# Patient Record
Sex: FEMALE | NOT HISPANIC OR LATINO | Employment: STUDENT | URBAN - METROPOLITAN AREA
[De-identification: names, ages, dates, MRNs, and addresses within clinical notes are randomized per-mention and may not be internally consistent; named-entity substitution may affect disease eponyms.]

---
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
## 2020-12-23 ENCOUNTER — OFFICE VISIT (OUTPATIENT)
Dept: INTERNAL MEDICINE | Facility: CLINIC | Age: 20
End: 2020-12-23
Payer: COMMERCIAL

## 2020-12-23 VITALS
HEIGHT: 66 IN | DIASTOLIC BLOOD PRESSURE: 72 MMHG | BODY MASS INDEX: 25.05 KG/M2 | TEMPERATURE: 98.5 F | SYSTOLIC BLOOD PRESSURE: 102 MMHG | WEIGHT: 155.9 LBS | OXYGEN SATURATION: 100 % | HEART RATE: 82 BPM

## 2020-12-23 DIAGNOSIS — E55.9 VITAMIN D DEFICIENCY: ICD-10-CM

## 2020-12-23 DIAGNOSIS — S29.012A UPPER BACK STRAIN, INITIAL ENCOUNTER: ICD-10-CM

## 2020-12-23 DIAGNOSIS — J45.20 INTERMITTENT ASTHMA, WELL CONTROLLED: Primary | ICD-10-CM

## 2020-12-23 DIAGNOSIS — L70.9 ACNE, UNSPECIFIED ACNE TYPE: ICD-10-CM

## 2020-12-23 DIAGNOSIS — M41.25 OTHER IDIOPATHIC SCOLIOSIS, THORACOLUMBAR REGION: ICD-10-CM

## 2020-12-23 PROCEDURE — 99214 OFFICE O/P EST MOD 30 MIN: CPT | Mod: 25 | Performed by: INTERNAL MEDICINE

## 2020-12-23 PROCEDURE — 90686 IIV4 VACC NO PRSV 0.5 ML IM: CPT | Performed by: INTERNAL MEDICINE

## 2020-12-23 PROCEDURE — 90471 IMMUNIZATION ADMIN: CPT | Performed by: INTERNAL MEDICINE

## 2020-12-23 RX ORDER — TRETINOIN 0.5 MG/G
CREAM TOPICAL
COMMUNITY
Start: 2020-09-11 | End: 2021-07-12

## 2020-12-23 RX ORDER — ESCITALOPRAM OXALATE 20 MG/1
TABLET ORAL
COMMUNITY
Start: 2020-11-25 | End: 2021-11-18 | Stop reason: SINTOL

## 2020-12-23 RX ORDER — CLINDAMYCIN PHOSPHATE 10 MG/G
GEL TOPICAL
COMMUNITY
Start: 2020-09-11 | End: 2021-11-18

## 2020-12-23 RX ORDER — ESCITALOPRAM OXALATE 5 MG/1
TABLET ORAL
COMMUNITY
Start: 2020-11-25 | End: 2021-11-18 | Stop reason: SINTOL

## 2020-12-23 RX ORDER — ALBUTEROL SULFATE 90 UG/1
2 AEROSOL, METERED RESPIRATORY (INHALATION) EVERY 4 HOURS PRN
Qty: 18 G | Refills: 11 | Status: SHIPPED | OUTPATIENT
Start: 2020-12-23 | End: 2022-01-05

## 2020-12-23 ASSESSMENT — MIFFLIN-ST. JEOR: SCORE: 1493.91

## 2020-12-23 NOTE — PROGRESS NOTES
Subjective     Emiliana Helms is a 20 year old female who presents to clinic today for the following health issues:    HPI         Derm Problem  Onset/Duration: since age 11, sx have worsened over the past 5 years  Description  Location: face, chest and back   Character: acne - cluster of raised red bumps and white heads   Itching: no  Intensity:  Moderate to severe   Progression of Symptoms:  worsening and constant  Accompanying signs and symptoms:   Fever: no  Body aches or joint pain: no  Sore throat symptoms: no  Recent cold symptoms: no  History:           Previous episodes of similar rash: None  New exposures:  None  Recent travel: no  Exposure to similar rash: no  Precipitating or alleviating factors: none  Therapies tried and outcome: clindamycin gel and tretinoin topical cream - used to be effective but not anymore, would like to discuss getting a referral to see dermatology     Has tried numerous therapies for acne that initially helped, but currently are not providing her adequate relief.  She is wishing to change to Accutane, and was told that I would be able to prescribe that for her.    2.    Asthma stable.  Has not had any recent breathing troubles beyond usual baseline.  Has not any recent acute respiratory events.  Using medication as directed with reported side effects    ACT Total Scores 8/18/2017 7/31/2019 12/23/2020   ACT TOTAL SCORE - - -   ASTHMA ER VISITS - - -   ASTHMA HOSPITALIZATIONS - - -   ACT TOTAL SCORE (Goal Greater than or Equal to 20) 23 20 24   In the past 12 months, how many times did you visit the emergency room for your asthma without being admitted to the hospital? 0 0 -   In the past 12 months, how many times were you hospitalized overnight because of your asthma? 0 0 0        3.  Depression:  On escitalopram through [facility], working well, no side effects     4.  Chronic upper back pain, significantly more noticeable since she has been doing much more computer  "screen time over the past several months.  She has a history of scoliosis, discussed in 2014.  Reviewed x-rays from that time showing throat thoracolumbar scoliosis.  She is never had any formal physical therapy or treatment for this.  Has chronic pain in the upper shoulders and right shoulder area.    **I reviewed the information recorded in the patient's EPIC chart (including but not limited to medical history, surgical history, family history, problem list, medication list, and allergy list) and updated the information as indicated based on the patients reported information.         Review of Systems   Constitutional, HEENT, cardiovascular, pulmonary, gi and gu systems are negative, except as otherwise noted.      Objective    /72   Pulse 82   Temp 98.5  F (36.9  C) (Temporal)   Ht 1.676 m (5' 6\")   Wt 70.7 kg (155 lb 14.4 oz)   LMP 12/22/2020 (Exact Date)   SpO2 100%   BMI 25.16 kg/m    Body mass index is 25.16 kg/m .  Physical Exam   GENERAL alert and no distress  EYES:  Normal sclera,conjunctiva, EOMI  HENT: oral and posterior pharynx without lesions or erythema, facies symmetric  NECK: Neck supple. No LAD, without thyroidmegaly.  RESP: Clear to ausculation bilaterally without wheezes or crackles. Normal BS in all fields.  CV: RRR normal S1S2 without murmurs, rubs or gallops.  LYMPH: no cervical lymph adenopathy appreciated  MS: extremities-scoliosis of the mid spine, prominence of the right rhomboid and scapula.  Able to fully flex forward, no radicular signs or symptoms.  EXT:  no lower extremity edema  PSYCH: Alert and oriented times 3; speech- coherent  SKIN:  No obvious significant skin lesions on visible portions of face             (J45.20) Intermittent asthma, well controlled  (primary encounter diagnosis)  Comment: This condition is currently controlled on the current medical regimen.  Continue current therapy.   Plan: albuterol (PROAIR HFA/PROVENTIL HFA/VENTOLIN         HFA) 108 (90 " Base) MCG/ACT inhaler            (M41.25) Other idiopathic scoliosis, thoracolumbar region  Comment: Reviewed x-rays from 2014 showing the thoracal lumbar scoliosis to a mild degree.  She has never endured any treatment for this, not seeing any physical therapist.  Explained the role of the scoliosis producing abnormal can ask when she sits at a computer desk over time, and have scoliosis creates a mechanical disadvantage.  Recommend starting with physical therapy for stretching and strengthening.  Refer to physical medicine and rehabilitation if she does not have any improvement.  Plan: [facility]            (S29.012A) Upper back strain, initial encounter  Comment:   Plan: [facility]            (E55.9) Vitamin D deficiency  Comment: Recommend least 2000 is vitamin D every day, if not 4000 November through March.  Plan:     (L70.9) Acne, unspecified acne type  Comment: Multiple treatments tried, she feels they are currently not effective for her.  She wishes to consider Accutane.  Explained to her that I have not able to prescribe Accutane because requires special certification but I do not have.  Briefly describe the serious nature of this medicine including the significant teratogenic potential, especially in women of childbearing age.  She should make referral to dermatology clinic who will discuss the use of this medicine and consider prescribing it if mutually agreeable.  Plan: DERMATOLOGY ADULT REFERRAL             I spent greater than 25 minutes with pt, greater than 50% of time was educational and counseling.

## 2020-12-23 NOTE — PATIENT INSTRUCTIONS
*  Referral to physical therapists for management of the scoliosis and chronic upper back pain    *  Possible referral to physical medicine and rehabilitation specialists if not much better.     *  Make appointment in the Dermatology Clinic for evaluation of Accutane for acne management.      *  Use the Albuterol inhaler as needed for any coughing, wheezing, tightness in the breathing, or shortness of breath.   Consider using it before any known triggers for our coughing or wheezing (usually these include cold or hot temperatures, humidity, dust, air pollutants, smoke).  Expect that your airways may remain more easily irritated for a few weeks after upper respiratory infections.     If you require the albuterol rescue inhaler on a regular basis more than a few times per week, you may need additional medications to help control the breathing better.    These are the available forms of Albuterol, your insurance formulary will determine which one is preferred.  They all work the same.

## 2020-12-31 NOTE — PROGRESS NOTES
[facility] Initial Evaluation    Subjective:  Emiliana Helms is a 20 year old female with a thoracic condition. Symptoms commenced as a result of: chronic upper back and thoracic spine pain for the past 5 years starting for unknown reasons. Condition occurred in the following environment: NA. Onset of symptoms: Date on order: 12/23/20. Location of symptoms: upper traps, bilateral middle and lower thoracic spine. Pain level on number scale: 1-7/10. Quality of pain: soreness. Associated symptoms: stiffness, denies numbness and tingling. Pain frequency (constant/intermittent): constant. Symptoms are exacerbated by: sitting, computer work, sleeping, laying in bed. Symptoms are relieved by: heat, movement. Progression of symptoms since onset (same/better/worse): worse. Special tests (x-ray, MRI, CT scan, EMG, bone scan): none recently. Previous treatment: None. Improvement with previous treatment: NA. General health as reported by patient is excellent. Pertinent medical history includes: See Epic. Medical allergies: see Epic. Other pertinent surgeries: see Epic. Current medications: See Epic. Occupation: [employer] student. Patient is (working in normal job without restrictions/working in normal job with restrictions/working in an alternate job/not working due to present treatment problem): NA. Primary job tasks: NA. Barriers at home/work: None reported by patient. Red flags: None reported by patient.    Objective  Posture     Sitting (good/fair/poor):  poor  Standing (good/fair/poor):  fair  Protruded head (yes/no):  no  Wry neck (right/left/nil):  nil  Relevant wry neck (yes/no):  no  Correction of posture (better/worse/no effect): better    Cervical Movement Loss Fausto Mod Min Nil Pain   Protrusion    x    Flexion    x    Retraction   x     Extension   x     Rotation Left   x     Rotation Right   x     Lateral Flexion Right    x    Lateral Flexion Left    x      Thoracic AROM: FL = WNL, EXT = min  loss  Weak scapular stabilizers  Tight pectorals    Assessment/Plan:    Patient is a 20 year old female with cervical and thoracic complaints.    Patient has the following significant findings with corresponding treatment plan.                Diagnosis 1:  Upper back pain  Pain -  manual therapy, self management, education, directional preference exercise and home program  Decreased ROM/flexibility - manual therapy and therapeutic exercise  Decreased joint mobility - manual therapy and therapeutic exercise  Decreased strength - therapeutic exercise and therapeutic activities  Impaired muscle performance - neuro re-education  Decreased function - therapeutic activities  Impaired posture - neuro re-education    Previous and current functional limitations:  (See Goal Flow Sheet for this information)    Short term and Long term goals: (See Goal Flow Sheet for this information)     Communication ability:  Patient appears to be able to clearly communicate and understand verbal and written communication and follow directions correctly.  Treatment Explanation - The following has been discussed with the patient:   RX ordered/plan of care  Anticipated outcomes  Possible risks and side effects  This patient would benefit from PT intervention to resume normal activities.   Rehab potential is good.    Frequency:  1 X week, once daily  Duration:  for 4 weeks tapering to 2 X a month over 1 month  Discharge Plan:  Achieve all LTG.  Independent in home treatment program.  Reach maximal therapeutic benefit.    Please refer to the daily flowsheet for treatment today, total treatment time and time spent performing 1:1 timed codes.

## 2021-01-04 ENCOUNTER — THERAPY VISIT (OUTPATIENT)
Dept: PHYSICAL THERAPY | Facility: CLINIC | Age: 21
End: 2021-01-04
Attending: INTERNAL MEDICINE
Payer: COMMERCIAL

## 2021-01-04 DIAGNOSIS — S29.012A UPPER BACK STRAIN, INITIAL ENCOUNTER: ICD-10-CM

## 2021-01-04 DIAGNOSIS — M54.9 UPPER BACK PAIN: ICD-10-CM

## 2021-01-04 DIAGNOSIS — M41.25 OTHER IDIOPATHIC SCOLIOSIS, THORACOLUMBAR REGION: ICD-10-CM

## 2021-01-04 PROCEDURE — 97161 PT EVAL LOW COMPLEX 20 MIN: CPT | Mod: GP | Performed by: PHYSICAL THERAPIST

## 2021-01-04 PROCEDURE — 97110 THERAPEUTIC EXERCISES: CPT | Mod: GP | Performed by: PHYSICAL THERAPIST

## 2021-01-04 PROCEDURE — 97530 THERAPEUTIC ACTIVITIES: CPT | Mod: GP | Performed by: PHYSICAL THERAPIST

## 2021-01-11 ENCOUNTER — THERAPY VISIT (OUTPATIENT)
Dept: PHYSICAL THERAPY | Facility: CLINIC | Age: 21
End: 2021-01-11
Payer: COMMERCIAL

## 2021-01-11 DIAGNOSIS — M54.9 UPPER BACK PAIN: ICD-10-CM

## 2021-01-11 PROCEDURE — 97110 THERAPEUTIC EXERCISES: CPT | Mod: GP | Performed by: PHYSICAL THERAPIST

## 2021-01-13 ENCOUNTER — OFFICE VISIT (OUTPATIENT)
Dept: DERMATOLOGY | Facility: CLINIC | Age: 21
End: 2021-01-13
Payer: COMMERCIAL

## 2021-01-13 VITALS — SYSTOLIC BLOOD PRESSURE: 91 MMHG | HEART RATE: 73 BPM | OXYGEN SATURATION: 100 % | DIASTOLIC BLOOD PRESSURE: 59 MMHG

## 2021-01-13 DIAGNOSIS — L81.0 POST-INFLAMMATORY HYPERPIGMENTATION: ICD-10-CM

## 2021-01-13 DIAGNOSIS — L70.0 ACNE VULGARIS: ICD-10-CM

## 2021-01-13 DIAGNOSIS — Z51.81 MEDICATION MONITORING ENCOUNTER: Primary | ICD-10-CM

## 2021-01-13 LAB
ALBUMIN SERPL-MCNC: 3.6 G/DL (ref 3.4–5)
ALP SERPL-CCNC: 59 U/L (ref 40–150)
ALT SERPL W P-5'-P-CCNC: 18 U/L (ref 0–50)
ANION GAP SERPL CALCULATED.3IONS-SCNC: 7 MMOL/L (ref 3–14)
AST SERPL W P-5'-P-CCNC: 13 U/L (ref 0–45)
BASOPHILS # BLD AUTO: 0 10E9/L (ref 0–0.2)
BASOPHILS NFR BLD AUTO: 0.6 %
BILIRUB SERPL-MCNC: 0.3 MG/DL (ref 0.2–1.3)
BUN SERPL-MCNC: 8 MG/DL (ref 7–30)
CALCIUM SERPL-MCNC: 8.7 MG/DL (ref 8.5–10.1)
CHLORIDE SERPL-SCNC: 108 MMOL/L (ref 94–109)
CHOLEST SERPL-MCNC: 183 MG/DL
CO2 SERPL-SCNC: 25 MMOL/L (ref 20–32)
CREAT SERPL-MCNC: 0.64 MG/DL (ref 0.52–1.04)
DIFFERENTIAL METHOD BLD: ABNORMAL
EOSINOPHIL # BLD AUTO: 0.1 10E9/L (ref 0–0.7)
EOSINOPHIL NFR BLD AUTO: 2.2 %
ERYTHROCYTE [DISTWIDTH] IN BLOOD BY AUTOMATED COUNT: 13.7 % (ref 10–15)
GFR SERPL CREATININE-BSD FRML MDRD: >90 ML/MIN/{1.73_M2}
GLUCOSE SERPL-MCNC: 85 MG/DL (ref 70–99)
HCG UR QL: NEGATIVE
HCT VFR BLD AUTO: 38.1 % (ref 35–47)
HDLC SERPL-MCNC: 82 MG/DL
HGB BLD-MCNC: 12.1 G/DL (ref 11.7–15.7)
LDLC SERPL CALC-MCNC: 93 MG/DL
LYMPHOCYTES # BLD AUTO: 1.8 10E9/L (ref 0.8–5.3)
LYMPHOCYTES NFR BLD AUTO: 56.4 %
MCH RBC QN AUTO: 27.7 PG (ref 26.5–33)
MCHC RBC AUTO-ENTMCNC: 31.8 G/DL (ref 31.5–36.5)
MCV RBC AUTO: 87 FL (ref 78–100)
MONOCYTES # BLD AUTO: 0.2 10E9/L (ref 0–1.3)
MONOCYTES NFR BLD AUTO: 6.4 %
NEUTROPHILS # BLD AUTO: 1.1 10E9/L (ref 1.6–8.3)
NEUTROPHILS NFR BLD AUTO: 34.4 %
NONHDLC SERPL-MCNC: 101 MG/DL
PLATELET # BLD AUTO: 232 10E9/L (ref 150–450)
POTASSIUM SERPL-SCNC: 3.6 MMOL/L (ref 3.4–5.3)
PROT SERPL-MCNC: 7.1 G/DL (ref 6.8–8.8)
RBC # BLD AUTO: 4.37 10E12/L (ref 3.8–5.2)
SODIUM SERPL-SCNC: 140 MMOL/L (ref 133–144)
TRIGL SERPL-MCNC: 40 MG/DL
WBC # BLD AUTO: 3.1 10E9/L (ref 4–11)

## 2021-01-13 PROCEDURE — 36415 COLL VENOUS BLD VENIPUNCTURE: CPT | Performed by: PHYSICIAN ASSISTANT

## 2021-01-13 PROCEDURE — 85025 COMPLETE CBC W/AUTO DIFF WBC: CPT | Performed by: PHYSICIAN ASSISTANT

## 2021-01-13 PROCEDURE — 99213 OFFICE O/P EST LOW 20 MIN: CPT | Performed by: PHYSICIAN ASSISTANT

## 2021-01-13 PROCEDURE — 80061 LIPID PANEL: CPT | Performed by: PHYSICIAN ASSISTANT

## 2021-01-13 PROCEDURE — 80053 COMPREHEN METABOLIC PANEL: CPT | Performed by: PHYSICIAN ASSISTANT

## 2021-01-13 PROCEDURE — 81025 URINE PREGNANCY TEST: CPT | Performed by: PHYSICIAN ASSISTANT

## 2021-01-13 RX ORDER — BUSPIRONE HYDROCHLORIDE 5 MG/1
5 TABLET ORAL 2 TIMES DAILY
COMMUNITY
Start: 2021-01-04 | End: 2021-11-18

## 2021-01-13 NOTE — PATIENT INSTRUCTIONS
Keratosis Pilaris:    Is a genetic rash that is considered common. Prevalent on the arms, upper legs, and  cheeks, it looks like small bumps. There is no cure, but there are some topical creams  that will help smooth out the bumps.   Wash body daily with a gentle cleanser( Dove, Cetaphil, or Vanicream) or just friction and water (use soap on underarms, groin, and feet).  Apply a thick emollient at least once a day. If showering apply emollient within 2-3 minutes of showering.   Consider AmLactin or Eucerin Plus twice daily(can purchase over-the-counter) as a moisturizer to affected area.  Begin Tretinoin to affected area nightly. Can be very drying so make sure to moisturize.  Okay to use topical steroid on inflamed areas 2x a day for a short period of time (2-3 days). This can calm down some of the redness.  Side effects of topical steroids including but not limited to atrophy (skin thinning), striae (stretch marks) telangiectasias, steroid acne, and others. Do not apply to normal skin. Do not apply to discolored skin that does not have rash present.   Consider over the counter DERMAdoctor high potency daily body peel to affected areas.     Accutane education:    Accutane is discussed fully with the patient. It is a very effective drug to treat acne vulgaris but has many significant side effects. Chief among these are teratogensis, hepatic injury, dyslipidemia and severe drying of the mucous membranes. All of these issues have been discussed in details. Monthly blood tests to monitor lipids and liver functions will be necessary. Expect painful dryness and/or fissuring around the lips, eyes, and other moist areas of the body. Balms may be protective. Contact lens may be too painful to wear temporarily while on this drug. Episodes of significant depression have been reported, including suicidal ideation and attempts in rare cases. It may also cause pseudotumor cerebri (idiopathic intracranial hypertension) and  hyperostosis (excessive growth of bone). The patient will report any such changes in mood, depressive symptoms or suicidal thoughts, headaches, joint or bone pains.    Female patients MUST use two simultaneous methods of family planning. Accutane is Category X for pregnancy, meaning it will cause fetal teratogenic malformations, and pregnancy MUST be avoided while on this drug.    The dose is 0.5-2 mg/kg in one to two divided doses for 15-20 weeks.    Wait 6 months after stopping accutane before getting piercing, tattoos, and/or laser treatment.    After discussion of these important issues, s/he indicates complete understanding of all of the above, and does wish to proceed with accutane therapy.

## 2021-01-13 NOTE — LETTER
1/13/2021         RE: Emiliana Helms  [address] [facility] [address] [address]        Dear Colleague,    Thank you for referring your patient, Emiliana Helms, to the [facility]. Please see a copy of my visit note below.    HPI:  Emiliana Helms is a 20 year old female patient here today for acne on face, chest, shoulders, upper back  .  Patient states this has been present for years.  Patient reports the following symptoms: breakout .  Patient reports the following previous treatments:roland, clinda topical, and tretinoin with minimal improvement.  Patient reports the following modifying factors: none.  Associated symptoms: none.  Patient has no other skin complaints today.  Remainder of the HPI, Meds, PMH, Allergies, FH, and SH was reviewed in chart.    Pertinent Hx:   lexapro and buspar for anxiety and depression.   Past Medical History:   Diagnosis Date     Asthma, intermittent      Constipation 06/26/2011     Contact with or exposure to tuberculosis 12/2003    on INH ppd neg     Mild persistent asthma 08/20/2012     Other idiopathic scoliosis, thoracolumbar region 2014     Undiagnosed cardiac murmurs     tiny PFO     Vitamin D deficiency 2017    vitamin D 15       No past surgical history on file.     Family History   Problem Relation Age of Onset     Diabetes Maternal Grandmother      Asthma Maternal Grandmother      Eye Disorder Maternal Grandfather         glaucoma     Diabetes Maternal Grandfather         insulin resistance     Glaucoma Maternal Grandfather      Goiter Mother      Hypertension Other         Mom's extended family     Family History Negative No family hx of        Social History     Socioeconomic History     Marital status: Single     Spouse name: Not on file     Number of children: Not on file     Years of education: Not on file     Highest education level: Not on file   Occupational History     Not on file   Social Needs     Financial resource strain: Not  on file     Food insecurity     Worry: Not on file     Inability: Not on file     Transportation needs     Medical: Not on file     Non-medical: Not on file   Tobacco Use     Smoking status: Never Smoker     Smokeless tobacco: Never Used     Tobacco comment: non smoking home   Substance and Sexual Activity     Alcohol use: No     Alcohol/week: 0.0 standard drinks     Comment: 9/2/16LW     Drug use: No     Comment: 9/2/16LW     Sexual activity: Never     Comment: 4/29/2016 l.n.9/2/16LW 8/18/2017 l.n.   Lifestyle     Physical activity     Days per week: Not on file     Minutes per session: Not on file     Stress: Not on file   Relationships     Social connections     Talks on phone: Not on file     Gets together: Not on file     Attends L[employer] day service: Not on file     Active member of club or organization: Not on file     Attends meetings of clubs or organizations: Not on file     Relationship status: Not on file     Intimate partner violence     Fear of current or ex partner: Not on file     Emotionally abused: Not on file     Physically abused: Not on file     Forced sexual activity: Not on file   Other Topics Concern     Not on file   Social History Narrative    Last name really is Sridharzen; charts say Analia.        Tyler Helms, [birth year]/99    Emiliana Helms, 2000    Barb Helms, 6/18/02    Bay Helms, 4/25/07    Mary Helms, 10/28/08                   Outpatient Encounter Medications as of 1/13/2021   Medication Sig Dispense Refill     albuterol (PROAIR HFA/PROVENTIL HFA/VENTOLIN HFA) 108 (90 Base) MCG/ACT inhaler Inhale 2 puffs into the lungs every 4 hours as needed for shortness of breath / dyspnea or wheezing PROFILE FOR FUTURE REFILLS; DISPENSE GENERIC ALBUTEROL HFA OR WHATEVER BRAND PREFERRED BY INSURANCE 18 g 11     busPIRone (BUSPAR) 5 MG tablet Take 5 mg by mouth 2 times daily       cetirizine (ZYRTEC) 10 MG tablet Take 1 tablet (10 mg) by mouth daily 90 tablet 3     clindamycin (CLINDAMAX) 1 %  external gel APPLY TO CLEAN, DRY SKIN IN THE MORNING       escitalopram (LEXAPRO) 20 MG tablet TAKE ONE Tablet BY MOUTH EVERY DAY with a 5mg tablet       escitalopram (LEXAPRO) 5 MG tablet take 1 TABLET by mouth DAILY with a 20mg tablet       fluticasone (FLONASE) 50 MCG/ACT nasal spray Spray 1 spray into both nostrils daily 16 g 3     tretinoin (RETIN-A) 0.05 % external cream APPLY TO CLEAN DRY SKIN ONCE DAILY BEFORE BEDTIME       No facility-administered encounter medications on file as of 1/13/2021.        Review Of Systems:  Skin: acne  Eyes: negative  Ears/Nose/Throat: negative  Respiratory: No shortness of breath, dyspnea on exertion, cough, or hemoptysis  Cardiovascular: negative  Gastrointestinal: negative  Genitourinary: negative  Musculoskeletal: negative  Neurologic: negative  Psychiatric: negative  Hematologic/Lymphatic/Immunologic: negative  Endocrine: negative      Objective:     BP 91/59   Pulse 73   LMP 12/22/2020 (Exact Date)   SpO2 100%   Breastfeeding No   Eyes: Conjunctivae/lids: Normal   ENT: Lips:  Normal  MSK: Normal  Cardiovascular: Peripheral edema none  Pulm: Breathing Normal  Neuro/Psych: Orientation: A/O x 3 Normal; Mood/Affect: Normal, NAD, WDWN  Pt accompanied by: self  Following areas examined: face, chest, shoulder, back  E[place]rett skin type:v   Findings:  Numerous inflammatory papules on face, chest, back, shoulders  Light brown/pink smooth macules on face, back  1mm brown scaly papules on lateral arms  Assessment and Plan:  1) Keratosis Pilaris:    Is a genetic rash that is considered common. Prevalent on the arms, upper legs, and  cheeks, it looks like small bumps. There is no cure, but there are some topical creams  that will help smooth out the bumps.   Wash body daily with a gentle cleanser( Dove, Cetaphil, or Vanicream) or just friction and water (use soap on underarms, groin, and feet).  Apply a thick emollient at least once a day. If showering apply emollient within 2-3  minutes of showering.   Consider AmLactin or Eucerin Plus twice daily(can purchase over-the-counter) as a moisturizer to affected area.  Begin Tretinoin to affected area nightly. Can be very drying so make sure to moisturize.  Okay to use topical steroid on inflamed areas 2x a day for a short period of time (2-3 days). This can calm down some of the redness.  Side effects of topical steroids including but not limited to atrophy (skin thinning), striae (stretch marks) telangiectasias, steroid acne, and others. Do not apply to normal skin. Do not apply to discolored skin that does not have rash present.   Consider over the counter DERMAdoctor high potency daily body peel to affected areas.       2) acne vulgaris, medication monitoring, PIH  lexapro and buspar for anxiety and depression. Will need a letter of clearance before starting next month.   Standing CBC, CMP, and fasting lipids (for females include urine pregnancy test)  Ipledge reviewed with patient and Ipledge consent form complete  Patient place in ipledge system: yes  Prescription sent to pharmacy: no  Ipledge: [id number]  Target:  Weight 145lb  Birth control:  abstinence  Urine pregnancy test: neg 1/13/20  Return to clinic 30 days  Isotretinoin (Accutane) education:  Do not share medication, do not donate blood, and do not get pregnant while on accutane.  While on Accutane: do not use other topical acne medications. Do not share medication. Do not get pregnant (including one month after stopping medication). Do not donate blood. Do not wax. Do not get laser, peels, or aggressive facials while on Accutane of for 6 months after.   Females must  their prescription within 7 days of having urine pregnancy test.  Dry lips and mouth, minor swelling of the eyelids or lips, crusty skin, nosebleeds, GI upset, or thinning of hair may occur. If any of these effects persist or worsen, tell your doctor or pharmacist promptly.   To relieve dry mouth, suck on  (sugarless) hard candy or ice chips, chew (sugarless) gum, drink water.   Remember that your doctor has prescribed this medication because he or she has judged that the benefit to you is greater than the risk of side effects. Many people using this medication do not have serious side effects.   Contact office immediately if you have any of these unlikely but serious side effects: mental/mood changes (e.g., depression,  aggressive or violent behavior, and in rare cases, thoughts of suicide), tingling feeling in the skin, quick/severe sun sensitivity, back/joint/muscle pain, signs of infection (e.g., fever, persistent sore throat, painful swallowing, peeling skin on palms/soles.   Isotretinoin may infrequently cause disease of the pancreatitis, that may rarely be fatal. Stop taking this medication and contact office immediately if you develop: severe stomach pain severe or persistent GI upset,   Stop taking this medication and tell your doctor immediately if you develop these unlikely but very serious side effects: severe headache, vision changes, ear ringing, hearling loss, chest pain, yellowing eyes, skin, dark urine, severe diarrhea, rectal bleeding,   Seek immediate medical attention if you notice any symptoms of a serious allergic reaction.  Wait 6 months after stopping accutane before getting piercing, tattoos, and/or laser treatment.    Accutane is discussed fully with the patient. It is a very effective drug to treat acne vulgaris but has many potential significant side effects. Chief among these are teratogensis, hepatic injury, dyslipidemia and severe drying of the mucous membranes. All of these issues have been discussed in details. Monthly blood tests to monitor lipids and liver functions will be necessary. Expect painful dryness and/or fissuring around the lips, eyes, and other moist areas of the body. Balms may be protective. Contact lens may be too painful to wear temporarily while on this drug.  Episodes of significant depression have been reported, including suicidal ideation and attempts in rare cases. It may also cause pseudotumor cerebri and hyperostosis. The patient will report any such changes in mood, depressive symptoms or suicidal thoughts, headaches, joint or bone pains. There is also a possible association with inflammatory bowel disease, although this is unproven at this point.         Follow up in 30-37 days        Again, thank you for allowing me to participate in the care of your patient.        Sincerely,        Petrona Golden PA-C

## 2021-01-15 ENCOUNTER — TELEPHONE (OUTPATIENT)
Dept: DERMATOLOGY | Facility: CLINIC | Age: 21
End: 2021-01-15

## 2021-01-15 NOTE — TELEPHONE ENCOUNTER
----- Message from Petrona Golden PA-C sent at 1/14/2021  6:10 PM CST -----  Labs in. You WBC was low and your neutrophils were low as well. This can be seen in an infection. Have you been feeling ill? ( fever, body aches, cough, sore throat, etc)

## 2021-01-15 NOTE — TELEPHONE ENCOUNTER
Called and spoke to patient.    Educated patient on lab results- WBC and neutrophil count both low, which can be seen in an infection.    Patient declined having fever, body aches, cough, sore throat.    Patient has completed comprehension questions in ipledge.    Patient voiced understanding.    Nena RN-BSN-PHN  [facility]  [phone]

## 2021-01-18 ENCOUNTER — THERAPY VISIT (OUTPATIENT)
Dept: PHYSICAL THERAPY | Facility: CLINIC | Age: 21
End: 2021-01-18
Payer: COMMERCIAL

## 2021-01-18 DIAGNOSIS — M54.9 UPPER BACK PAIN: ICD-10-CM

## 2021-01-18 PROCEDURE — 97110 THERAPEUTIC EXERCISES: CPT | Mod: GP | Performed by: PHYSICAL THERAPIST

## 2021-01-25 ENCOUNTER — THERAPY VISIT (OUTPATIENT)
Dept: PHYSICAL THERAPY | Facility: CLINIC | Age: 21
End: 2021-01-25
Payer: COMMERCIAL

## 2021-01-25 DIAGNOSIS — M54.9 UPPER BACK PAIN: ICD-10-CM

## 2021-01-25 PROCEDURE — 97110 THERAPEUTIC EXERCISES: CPT | Mod: GP | Performed by: PHYSICAL THERAPIST

## 2021-02-09 ENCOUNTER — TRANSFERRED RECORDS (OUTPATIENT)
Dept: HEALTH INFORMATION MANAGEMENT | Facility: CLINIC | Age: 21
End: 2021-02-09

## 2021-02-17 ENCOUNTER — OFFICE VISIT (OUTPATIENT)
Dept: DERMATOLOGY | Facility: CLINIC | Age: 21
End: 2021-02-17
Payer: COMMERCIAL

## 2021-02-17 ENCOUNTER — TELEPHONE (OUTPATIENT)
Dept: DERMATOLOGY | Facility: CLINIC | Age: 21
End: 2021-02-17

## 2021-02-17 VITALS — SYSTOLIC BLOOD PRESSURE: 98 MMHG | DIASTOLIC BLOOD PRESSURE: 60 MMHG | HEART RATE: 68 BPM

## 2021-02-17 DIAGNOSIS — L81.0 POST-INFLAMMATORY HYPERPIGMENTATION: ICD-10-CM

## 2021-02-17 DIAGNOSIS — L70.0 ACNE VULGARIS: Primary | ICD-10-CM

## 2021-02-17 DIAGNOSIS — Z51.81 MEDICATION MONITORING ENCOUNTER: ICD-10-CM

## 2021-02-17 LAB — HCG UR QL: NEGATIVE

## 2021-02-17 PROCEDURE — 99213 OFFICE O/P EST LOW 20 MIN: CPT | Performed by: PHYSICIAN ASSISTANT

## 2021-02-17 PROCEDURE — 81025 URINE PREGNANCY TEST: CPT | Performed by: PHYSICIAN ASSISTANT

## 2021-02-17 RX ORDER — ISOTRETINOIN 30 MG/1
30 CAPSULE ORAL DAILY
Qty: 30 CAPSULE | Refills: 0 | Status: SHIPPED | OUTPATIENT
Start: 2021-02-17 | End: 2021-11-18

## 2021-02-17 NOTE — PATIENT INSTRUCTIONS
Accutane education:    Accutane is discussed fully with the patient. It is a very effective drug to treat acne vulgaris but has many significant side effects. Chief among these are teratogensis, hepatic injury, dyslipidemia and severe drying of the mucous membranes. All of these issues have been discussed in details. Monthly blood tests to monitor lipids and liver functions will be necessary. Expect painful dryness and/or fissuring around the lips, eyes, and other moist areas of the body. Balms may be protective. Contact lens may be too painful to wear temporarily while on this drug. Episodes of significant depression have been reported, including suicidal ideation and attempts in rare cases. It may also cause pseudotumor cerebri (idiopathic intracranial hypertension) and hyperostosis (excessive growth of bone). The patient will report any such changes in mood, depressive symptoms or suicidal thoughts, headaches, joint or bone pains.    Female patients MUST use two simultaneous methods of family planning. Accutane is Category X for pregnancy, meaning it will cause fetal teratogenic malformations, and pregnancy MUST be avoided while on this drug.    The dose is 0.5-2 mg/kg in one to two divided doses for 15-20 weeks.    Wait 6 months after stopping accutane before getting piercing, tattoos, and/or laser treatment.    After discussion of these important issues, s/he indicates complete understanding of all of the above, and does wish to proceed with accutane therapy.

## 2021-02-17 NOTE — TELEPHONE ENCOUNTER
Called and spoke to patient.    Informed patient ua negative, provider sent rx to pharmacy, and received fax.    Patient voiced understanding.    Nena RN-BSN-PHN  [facility]  [phone]

## 2021-02-17 NOTE — PROGRESS NOTES
HPI:  Emiliana Helms is a 20 year old female patient here today for acne on face, chest, shoulders, upper back here to start month one of accutane. .  Patient states this has been present for years.  Patient reports the following symptoms: breakout .  Patient reports the following previous treatments:roland, clinda topical, and tretinoin with minimal improvement.  Patient reports the following modifying factors: none.  Associated symptoms: none.  Patient has no other skin complaints today.  Remainder of the HPI, Meds, PMH, Allergies, FH, and SH was reviewed in chart.    Pertinent Hx:   lexapro and buspar for anxiety and depression. A letter of clearance on file from Rachel Irby from [facility].    Past Medical History:   Diagnosis Date     Asthma, intermittent      Constipation 06/26/2011     Contact with or exposure to tuberculosis 12/2003    on INH ppd neg     Mild persistent asthma 08/20/2012     Other idiopathic scoliosis, thoracolumbar region 2014     Undiagnosed cardiac murmurs     tiny PFO     Vitamin D deficiency 2017    vitamin D 15       No past surgical history on file.     Family History   Problem Relation Age of Onset     Diabetes Maternal Grandmother      Asthma Maternal Grandmother      Eye Disorder Maternal Grandfather         glaucoma     Diabetes Maternal Grandfather         insulin resistance     Glaucoma Maternal Grandfather      Goiter Mother      Hypertension Other         Mom's extended family     Family History Negative No family hx of        Social History     Socioeconomic History     Marital status: Single     Spouse name: Not on file     Number of children: Not on file     Years of education: Not on file     Highest education level: Not on file   Occupational History     Not on file   Social Needs     Financial resource strain: Not on file     Food insecurity     Worry: Not on file     Inability: Not on file     Transportation needs     Medical: Not on file     Non-medical: Not on  file   Tobacco Use     Smoking status: Never Smoker     Smokeless tobacco: Never Used     Tobacco comment: non smoking home   Substance and Sexual Activity     Alcohol use: No     Alcohol/week: 0.0 standard drinks     Comment: 9/2/16LW     Drug use: No     Comment: 9/2/16LW     Sexual activity: Never     Comment: 4/29/2016 l.n.9/2/16LW 8/18/2017 l.n.   Lifestyle     Physical activity     Days per week: Not on file     Minutes per session: Not on file     Stress: Not on file   Relationships     Social connections     Talks on phone: Not on file     Gets together: Not on file     Attends Pentecostalism service: Not on file     Active member of club or organization: Not on file     Attends meetings of clubs or organizations: Not on file     Relationship status: Not on file     Intimate partner violence     Fear of current or ex partner: Not on file     Emotionally abused: Not on file     Physically abused: Not on file     Forced sexual activity: Not on file   Other Topics Concern     Not on file   Social History Narrative    Last name really is Ahmed; charts say Analia.        Tyler Helms, [birth year]    Emiliana Helms, 2000    Barb Helms, 6/18/02    Bay Helms, 4/25/07    Mary Helms, 10/28/08                   Outpatient Encounter Medications as of 2/17/2021   Medication Sig Dispense Refill     albuterol (PROAIR HFA/PROVENTIL HFA/VENTOLIN HFA) 108 (90 Base) MCG/ACT inhaler Inhale 2 puffs into the lungs every 4 hours as needed for shortness of breath / dyspnea or wheezing PROFILE FOR FUTURE REFILLS; DISPENSE GENERIC ALBUTEROL HFA OR WHATEVER BRAND PREFERRED BY INSURANCE 18 g 11     busPIRone (BUSPAR) 5 MG tablet Take 5 mg by mouth 2 times daily       cetirizine (ZYRTEC) 10 MG tablet Take 1 tablet (10 mg) by mouth daily 90 tablet 3     clindamycin (CLINDAMAX) 1 % external gel APPLY TO CLEAN, DRY SKIN IN THE MORNING       escitalopram (LEXAPRO) 20 MG tablet TAKE ONE Tablet BY MOUTH EVERY DAY with a 5mg tablet        escitalopram (LEXAPRO) 5 MG tablet take 1 TABLET by mouth DAILY with a 20mg tablet       fluticasone (FLONASE) 50 MCG/ACT nasal spray Spray 1 spray into both nostrils daily 16 g 3     tretinoin (RETIN-A) 0.05 % external cream APPLY TO CLEAN DRY SKIN ONCE DAILY BEFORE BEDTIME       No facility-administered encounter medications on file as of 2/17/2021.        Review Of Systems:  Skin: acne  Eyes: negative  Ears/Nose/Throat: negative  Respiratory: No shortness of breath, dyspnea on exertion, cough, or hemoptysis  Cardiovascular: negative  Gastrointestinal: negative  Genitourinary: negative  Musculoskeletal: negative  Neurologic: negative  Psychiatric: negative  Hematologic/Lymphatic/Immunologic: negative  Endocrine: negative      Objective:     There were no vitals taken for this visit.  Eyes: Conjunctivae/lids: Normal   ENT: Lips:  Normal  MSK: Normal  Cardiovascular: Peripheral edema none  Pulm: Breathing Normal  Neuro/Psych: Orientation: A/O x 3 Normal; Mood/Affect: Normal, NAD, WDWN  Pt accompanied by: self  Following areas examined: face, chest, shoulder, back  E[place]rett skin type:v   Findings:  Numerous inflammatory papules on face, chest, back, shoulders  Light brown/pink smooth macules on face, back  1mm brown scaly papules on lateral arms  Assessment and Plan:  1)  acne vulgaris, medication monitoring, PIH  lexapro and buspar for anxiety and depression. A letter of clearance on file from Rachel Irby from [facility].  Standing CBC, CMP, and fasting lipids (for females include urine pregnancy test)  Ipledge reviewed with patient and Ipledge consent form complete  Patient place in ipledge system: yes 2/17/21  Prescription sent to pharmacy: yes 2/17/21  Ipledge: [id number]  Target:[id number] at 150-200mg/kg  Total: 0  Weight 145lb  Birth control:  abstinence  Urine pregnancy test: neg 2/17/21  Return to clinic 30 days  Isotretinoin (Accutane) education:  Do not share medication, do not donate blood,  and do not get pregnant while on accutane.  While on Accutane: do not use other topical acne medications. Do not share medication. Do not get pregnant (including one month after stopping medication). Do not donate blood. Do not wax. Do not get laser, peels, or aggressive facials while on Accutane of for 6 months after.   Females must  their prescription within 7 days of having urine pregnancy test.  Dry lips and mouth, minor swelling of the eyelids or lips, crusty skin, nosebleeds, GI upset, or thinning of hair may occur. If any of these effects persist or worsen, tell your doctor or pharmacist promptly.   To relieve dry mouth, suck on (sugarless) hard candy or ice chips, chew (sugarless) gum, drink water.   Remember that your doctor has prescribed this medication because he or she has judged that the benefit to you is greater than the risk of side effects. Many people using this medication do not have serious side effects.   Contact office immediately if you have any of these unlikely but serious side effects: mental/mood changes (e.g., depression,  aggressive or violent behavior, and in rare cases, thoughts of suicide), tingling feeling in the skin, quick/severe sun sensitivity, back/joint/muscle pain, signs of infection (e.g., fever, persistent sore throat, painful swallowing, peeling skin on palms/soles.   Isotretinoin may infrequently cause disease of the pancreatitis, that may rarely be fatal. Stop taking this medication and contact office immediately if you develop: severe stomach pain severe or persistent GI upset,   Stop taking this medication and tell your doctor immediately if you develop these unlikely but very serious side effects: severe headache, vision changes, ear ringing, hearling loss, chest pain, yellowing eyes, skin, dark urine, severe diarrhea, rectal bleeding,   Seek immediate medical attention if you notice any symptoms of a serious allergic reaction.  Wait 6 months after stopping  accutane before getting piercing, tattoos, and/or laser treatment.    Accutane is discussed fully with the patient. It is a very effective drug to treat acne vulgaris but has many potential significant side effects. Chief among these are teratogensis, hepatic injury, dyslipidemia and severe drying of the mucous membranes. All of these issues have been discussed in details. Monthly blood tests to monitor lipids and liver functions will be necessary. Expect painful dryness and/or fissuring around the lips, eyes, and other moist areas of the body. Balms may be protective. Contact lens may be too painful to wear temporarily while on this drug. Episodes of significant depression have been reported, including suicidal ideation and attempts in rare cases. It may also cause pseudotumor cerebri and hyperostosis. The patient will report any such changes in mood, depressive symptoms or suicidal thoughts, headaches, joint or bone pains. There is also a possible association with inflammatory bowel disease, although this is unproven at this point.         Follow up in 28-31 days

## 2021-02-17 NOTE — LETTER
2/17/2021         RE: Emiliana Helms  [address] [facility] [address]        Dear Colleague,    Thank you for referring your patient, Emiliana Helms, to the [facility]. Please see a copy of my visit note below.    HPI:  Emiliana Helms is a 20 year old female patient here today for acne on face, chest, shoulders, upper back here to start month one of accutane. .  Patient states this has been present for years.  Patient reports the following symptoms: breakout .  Patient reports the following previous treatments:roland, clinda topical, and tretinoin with minimal improvement.  Patient reports the following modifying factors: none.  Associated symptoms: none.  Patient has no other skin complaints today.  Remainder of the HPI, Meds, PMH, Allergies, FH, and SH was reviewed in chart.    Pertinent Hx:   lexapro and buspar for anxiety and depression. A letter of clearance on file from Rachel Irby from [facility].    Past Medical History:   Diagnosis Date     Asthma, intermittent      Constipation 06/26/2011     Contact with or exposure to tuberculosis 12/2003    on INH ppd neg     Mild persistent asthma 08/20/2012     Other idiopathic scoliosis, thoracolumbar region 2014     Undiagnosed cardiac murmurs     tiny PFO     Vitamin D deficiency 2017    vitamin D 15       No past surgical history on file.     Family History   Problem Relation Age of Onset     Diabetes Maternal Grandmother      Asthma Maternal Grandmother      Eye Disorder Maternal Grandfather         glaucoma     Diabetes Maternal Grandfather         insulin resistance     Glaucoma Maternal Grandfather      Goiter Mother      Hypertension Other         Mom's extended family     Family History Negative No family hx of        Social History     Socioeconomic History     Marital status: Single     Spouse name: Not on file     Number of children: Not on file     Years of education: Not on file     Highest  education level: Not on file   Occupational History     Not on file   Social Needs     Financial resource strain: Not on file     Food insecurity     Worry: Not on file     Inability: Not on file     Transportation needs     Medical: Not on file     Non-medical: Not on file   Tobacco Use     Smoking status: Never Smoker     Smokeless tobacco: Never Used     Tobacco comment: non smoking home   Substance and Sexual Activity     Alcohol use: No     Alcohol/week: 0.0 standard drinks     Comment: 9/2/16LW     Drug use: No     Comment: 9/2/16LW     Sexual activity: Never     Comment: 4/29/2016 l.n.9/2/16LW 8/18/2017 l.n.   Lifestyle     Physical activity     Days per week: Not on file     Minutes per session: Not on file     Stress: Not on file   Relationships     Social connections     Talks on phone: Not on file     Gets together: Not on file     Attends [employer] service: Not on file     Active member of club or organization: Not on file     Attends meetings of clubs or organizations: Not on file     Relationship status: Not on file     Intimate partner violence     Fear of current or ex partner: Not on file     Emotionally abused: Not on file     Physically abused: Not on file     Forced sexual activity: Not on file   Other Topics Concern     Not on file   Social History Narrative    Last name really is Ahmed; charts say Analia.        Tyler Helms, 7/31/99    Emiliana Helms, 2000    Barb Helms, 6/18/02    Bay Helms, 4/25/07    Mary Helms, 10/28/08                   Outpatient Encounter Medications as of 2/17/2021   Medication Sig Dispense Refill     albuterol (PROAIR HFA/PROVENTIL HFA/VENTOLIN HFA) 108 (90 Base) MCG/ACT inhaler Inhale 2 puffs into the lungs every 4 hours as needed for shortness of breath / dyspnea or wheezing PROFILE FOR FUTURE REFILLS; DISPENSE GENERIC ALBUTEROL HFA OR WHATEVER BRAND PREFERRED BY INSURANCE 18 g 11     busPIRone (BUSPAR) 5 MG tablet Take 5 mg by mouth 2 times daily        cetirizine (ZYRTEC) 10 MG tablet Take 1 tablet (10 mg) by mouth daily 90 tablet 3     clindamycin (CLINDAMAX) 1 % external gel APPLY TO CLEAN, DRY SKIN IN THE MORNING       escitalopram (LEXAPRO) 20 MG tablet TAKE ONE Tablet BY MOUTH EVERY DAY with a 5mg tablet       escitalopram (LEXAPRO) 5 MG tablet take 1 TABLET by mouth DAILY with a 20mg tablet       fluticasone (FLONASE) 50 MCG/ACT nasal spray Spray 1 spray into both nostrils daily 16 g 3     tretinoin (RETIN-A) 0.05 % external cream APPLY TO CLEAN DRY SKIN ONCE DAILY BEFORE BEDTIME       No facility-administered encounter medications on file as of 2/17/2021.        Review Of Systems:  Skin: acne  Eyes: negative  Ears/Nose/Throat: negative  Respiratory: No shortness of breath, dyspnea on exertion, cough, or hemoptysis  Cardiovascular: negative  Gastrointestinal: negative  Genitourinary: negative  Musculoskeletal: negative  Neurologic: negative  Psychiatric: negative  Hematologic/Lymphatic/Immunologic: negative  Endocrine: negative      Objective:     There were no vitals taken for this visit.  Eyes: Conjunctivae/lids: Normal   ENT: Lips:  Normal  MSK: Normal  Cardiovascular: Peripheral edema none  Pulm: Breathing Normal  Neuro/Psych: Orientation: A/O x 3 Normal; Mood/Affect: Normal, NAD, WDWN  Pt accompanied by: self  Following areas examined: face, chest, shoulder, back  [place] skin type:v   Findings:  Numerous inflammatory papules on face, chest, back, shoulders  Light brown/pink smooth macules on face, back  1mm brown scaly papules on lateral arms  Assessment and Plan:  1)  acne vulgaris, medication monitoring, PIH  lexapro and buspar for anxiety and depression. A letter of clearance on file from Rachel Irby from [facility].  Standing CBC, CMP, and fasting lipids (for females include urine pregnancy test)  Ipledge reviewed with patient and Ipledge consent form complete  Patient place in ipledge system: yes 2/17/21  Prescription sent to  pharmacy: yes 2/17/21  Ipledge: [id number]  Target:[id number] at 150-200mg/kg  Total: 0  Weight 145lb  Birth control:  abstinence  Urine pregnancy test: neg 2/17/21  Return to clinic 30 days  Isotretinoin (Accutane) education:  Do not share medication, do not donate blood, and do not get pregnant while on accutane.  While on Accutane: do not use other topical acne medications. Do not share medication. Do not get pregnant (including one month after stopping medication). Do not donate blood. Do not wax. Do not get laser, peels, or aggressive facials while on Accutane of for 6 months after.   Females must  their prescription within 7 days of having urine pregnancy test.  Dry lips and mouth, minor swelling of the eyelids or lips, crusty skin, nosebleeds, GI upset, or thinning of hair may occur. If any of these effects persist or worsen, tell your doctor or pharmacist promptly.   To relieve dry mouth, suck on (sugarless) hard candy or ice chips, chew (sugarless) gum, drink water.   Remember that your doctor has prescribed this medication because he or she has judged that the benefit to you is greater than the risk of side effects. Many people using this medication do not have serious side effects.   Contact office immediately if you have any of these unlikely but serious side effects: mental/mood changes (e.g., depression,  aggressive or violent behavior, and in rare cases, thoughts of suicide), tingling feeling in the skin, quick/severe sun sensitivity, back/joint/muscle pain, signs of infection (e.g., fever, persistent sore throat, painful swallowing, peeling skin on palms/soles.   Isotretinoin may infrequently cause disease of the pancreatitis, that may rarely be fatal. Stop taking this medication and contact office immediately if you develop: severe stomach pain severe or persistent GI upset,   Stop taking this medication and tell your doctor immediately if you develop these unlikely but very serious side  effects: severe headache, vision changes, ear ringing, hearling loss, chest pain, yellowing eyes, skin, dark urine, severe diarrhea, rectal bleeding,   Seek immediate medical attention if you notice any symptoms of a serious allergic reaction.  Wait 6 months after stopping accutane before getting piercing, tattoos, and/or laser treatment.    Accutane is discussed fully with the patient. It is a very effective drug to treat acne vulgaris but has many potential significant side effects. Chief among these are teratogensis, hepatic injury, dyslipidemia and severe drying of the mucous membranes. All of these issues have been discussed in details. Monthly blood tests to monitor lipids and liver functions will be necessary. Expect painful dryness and/or fissuring around the lips, eyes, and other moist areas of the body. Balms may be protective. Contact lens may be too painful to wear temporarily while on this drug. Episodes of significant depression have been reported, including suicidal ideation and attempts in rare cases. It may also cause pseudotumor cerebri and hyperostosis. The patient will report any such changes in mood, depressive symptoms or suicidal thoughts, headaches, joint or bone pains. There is also a possible association with inflammatory bowel disease, although this is unproven at this point.         Follow up in 28-31 days        Again, thank you for allowing me to participate in the care of your patient.        Sincerely,        Petrona Golden PA-C

## 2021-03-11 PROBLEM — M54.9 UPPER BACK PAIN: Status: RESOLVED | Noted: 2021-01-04 | Resolved: 2021-03-11

## 2021-03-11 NOTE — PROGRESS NOTES
Discharge Note    Progress reporting period is from initial evaluation date (please see noted date below) to Jan 25, 2021.  Linked Episodes   Type: Episode: Status: Noted: Resolved: Last update: Updated by:   PHYSICAL THERAPY upper back Active 1/4/2021 1/25/2021  9:40 AM Carlos Richard, PAUL      Comments:       Emiliana failed to follow up and current status is unknown.  Please see information below for last relevant information on current status.  Patient seen for 4 visits.    SUBJECTIVE  Subjective changes noted by patient:  Patient reports improving pain symptoms. Decreased pain in shoulders. Most pain is located in spine - lower cervical and thoracic spine area. New exercises are going fine - challenging.  .  Current pain level is 0/10.     Previous pain level was  7/10.   Changes in function:  Yes (See Goal flowsheet attached for changes in current functional level)  Adverse reaction to treatment or activity: None    OBJECTIVE  Changes noted in objective findings: weak scapular stabilizers; front plank = 30 seconds, cervical AROM WNL     ASSESSMENT/PLAN  Diagnosis: upper back pain   Updated problem list and treatment plan:     STG/LTGs have been met or progress has been made towards goals:  Yes, please see goal flowsheet for most current information  Assessment of Progress: current status is unknown.    Last current status: Pt is progressing as expected   Self Management Plans:  HEP  I have re-evaluated this patient and find that the nature, scope, duration and intensity of the therapy is appropriate for the medical condition of the patient.  Emiliana continues to require the following intervention to meet STG and LTG's:  HEP.    Recommendations:  Discharge with current home program.  Patient to follow up with MD as needed.    Please refer to the daily flowsheet for treatment today, total treatment time and time spent performing 1:1 timed codes.

## 2021-04-01 ENCOUNTER — TELEPHONE (OUTPATIENT)
Dept: PEDIATRICS | Facility: CLINIC | Age: 21
End: 2021-04-01

## 2021-04-01 DIAGNOSIS — L70.0 ACNE VULGARIS: ICD-10-CM

## 2021-04-02 RX ORDER — ISOTRETINOIN 30 MG/1
30 CAPSULE ORAL DAILY
Qty: 30 CAPSULE | Refills: 0 | OUTPATIENT
Start: 2021-04-02

## 2021-04-02 NOTE — TELEPHONE ENCOUNTER
ISOtretinoin (ABSORICA) 30 MG capsule  Request refused: Originating/Specialty Provider to approve    Routing to RUMA Rae--- pt was a NO SHOW at derm appt on 3/17/2021.

## 2021-04-02 NOTE — CONFIDENTIAL NOTE
Denied refill of isoretinoin. Patient no-showed dermatology visit.   Request inappropriate- can only be filled with appropriate follow-up with specialty provider.    Gin Agarwal MD on 4/2/2021 at 12:07 PM

## 2021-04-18 ENCOUNTER — OFFICE VISIT (OUTPATIENT)
Dept: URGENT CARE | Facility: URGENT CARE | Age: 21
End: 2021-04-18
Payer: COMMERCIAL

## 2021-04-18 VITALS
HEART RATE: 64 BPM | DIASTOLIC BLOOD PRESSURE: 70 MMHG | BODY MASS INDEX: 27.69 KG/M2 | SYSTOLIC BLOOD PRESSURE: 110 MMHG | TEMPERATURE: 97.7 F | WEIGHT: 166.2 LBS | HEIGHT: 65 IN | RESPIRATION RATE: 16 BRPM

## 2021-04-18 DIAGNOSIS — G44.209 TENSION HEADACHE: Primary | ICD-10-CM

## 2021-04-18 DIAGNOSIS — R10.13 EPIGASTRIC PAIN: ICD-10-CM

## 2021-04-18 PROCEDURE — 99214 OFFICE O/P EST MOD 30 MIN: CPT | Mod: 25 | Performed by: PHYSICIAN ASSISTANT

## 2021-04-18 PROCEDURE — 96372 THER/PROPH/DIAG INJ SC/IM: CPT | Performed by: PHYSICIAN ASSISTANT

## 2021-04-18 RX ORDER — FAMOTIDINE 20 MG/1
20 TABLET, FILM COATED ORAL 2 TIMES DAILY
Qty: 60 TABLET | Refills: 0 | Status: SHIPPED | OUTPATIENT
Start: 2021-04-18 | End: 2021-05-18

## 2021-04-18 RX ORDER — ONDANSETRON 4 MG/1
4 TABLET, ORALLY DISINTEGRATING ORAL ONCE
Status: COMPLETED | OUTPATIENT
Start: 2021-04-18 | End: 2021-04-18

## 2021-04-18 RX ORDER — KETOROLAC TROMETHAMINE 30 MG/ML
30 INJECTION, SOLUTION INTRAMUSCULAR; INTRAVENOUS ONCE
Status: COMPLETED | OUTPATIENT
Start: 2021-04-18 | End: 2021-04-18

## 2021-04-18 RX ADMIN — ONDANSETRON 4 MG: 4 TABLET, ORALLY DISINTEGRATING ORAL at 12:03

## 2021-04-18 RX ADMIN — KETOROLAC TROMETHAMINE 30 MG: 30 INJECTION, SOLUTION INTRAMUSCULAR; INTRAVENOUS at 12:04

## 2021-04-18 ASSESSMENT — MIFFLIN-ST. JEOR: SCORE: 1524.76

## 2021-04-18 NOTE — PROGRESS NOTES
URGENT CARE VISIT:    SUBJECTIVE:   Emiliana Helms is a 20 year old female who comes in for evaluation of headache.  Headache began 3week(s)} ago.  DESCRIPTION OF HEADACHE:   Location of pain: frontal   Character of pain:throbbing   Timing and onset: gradual onset and still present  Radiation of pain: NO   Severity of pain: moderate   Accompanying symptoms: nausea and photophobia   Negative for: dizziness, focal weakness and sensory deficits    History of Migranes: No but gets headaches after discontinuing antidepressant. She stopped 3 weeks ago.   Are most headaches similar in presentation: YES  Tried OTC analgesics with no relief.     Also here for epigastric pain for one week. Pain is intermittent and sharp. Associated symptoms include nausea and acidic taste in mouth at night. Denies diarrhea, vomiting, constipation, or heartburn. No treatments tried. Family all had H. Pylori.       PMH:   Past Medical History:   Diagnosis Date     Asthma, intermittent      Constipation 06/26/2011     Contact with or exposure to tuberculosis 12/2003    on INH ppd neg     Mild persistent asthma 08/20/2012     Other idiopathic scoliosis, thoracolumbar region 2014     Undiagnosed cardiac murmurs     tiny PFO     Vitamin D deficiency 2017    vitamin D 15     Allergies: Zithromax [azithromycin dihydrate]   Medications:   Current Outpatient Medications   Medication Sig Dispense Refill     albuterol (PROAIR HFA/PROVENTIL HFA/VENTOLIN HFA) 108 (90 Base) MCG/ACT inhaler Inhale 2 puffs into the lungs every 4 hours as needed for shortness of breath / dyspnea or wheezing PROFILE FOR FUTURE REFILLS; DISPENSE GENERIC ALBUTEROL HFA OR WHATEVER BRAND PREFERRED BY INSURANCE 18 g 11     busPIRone (BUSPAR) 5 MG tablet Take 5 mg by mouth 2 times daily       cetirizine (ZYRTEC) 10 MG tablet Take 1 tablet (10 mg) by mouth daily 90 tablet 3     clindamycin (CLINDAMAX) 1 % external gel APPLY TO CLEAN, DRY SKIN IN THE MORNING       escitalopram (LEXAPRO)  "20 MG tablet TAKE ONE Tablet BY MOUTH EVERY DAY with a 5mg tablet       escitalopram (LEXAPRO) 5 MG tablet take 1 TABLET by mouth DAILY with a 20mg tablet       famotidine (PEPCID) 20 MG tablet Take 1 tablet (20 mg) by mouth 2 times daily 60 tablet 0     fluticasone (FLONASE) 50 MCG/ACT nasal spray Spray 1 spray into both nostrils daily 16 g 3     ISOtretinoin (ABSORICA) 30 MG capsule Take 1 capsule (30 mg) by mouth daily 30 capsule 0     tretinoin (RETIN-A) 0.05 % external cream APPLY TO CLEAN DRY SKIN ONCE DAILY BEFORE BEDTIME       Social History:   Social History     Tobacco Use     Smoking status: Never Smoker     Smokeless tobacco: Never Used     Tobacco comment: non smoking home   Substance Use Topics     Alcohol use: No     Alcohol/week: 0.0 standard drinks     Comment: 9/2/16LW       ROS:  Review of systems negative except as stated above.    OBJECTIVE:  /70   Pulse 64   Temp 97.7  F (36.5  C)   Resp 16   Ht 1.651 m (5' 5\")   Wt 75.4 kg (166 lb 3.2 oz)   BMI 27.66 kg/m    GENERAL APPEARANCE: healthy, alert and no distress  EYES: EOMI,  PERRL, conjunctiva clear  HENT: ear canals and TM's normal.  Nose and mouth without ulcers, erythema or lesions  NECK: supple, nontender, no lymphadenopathy  RESP: lungs clear to auscultation - no rales, rhonchi or wheezes  CV: regular rates and rhythm, normal S1 S2, no murmur noted  ABDOMEN:  soft, nontender, no HSM or masses and bowel sounds normal  NEURO: Normal strength and tone in all four extremities, speech normal, cranial nerves 2-12 intact, Romberg negative, finger to nose and rapid alternating movements test normal, sensitive to light tough throughout   SKIN: no suspicious lesions or rashes    ASSESSMENT:    ICD-10-CM    1. Tension headache  G44.209 ketorolac (TORADOL) injection 30 mg     ondansetron (ZOFRAN-ODT) ODT tab 4 mg   2. Epigastric pain  R10.13 Helicobacter pylori Antigen Stool     famotidine (PEPCID) 20 MG tablet       PLAN:  Patient " Instructions   Headache:  Patient was educated on the natural course of headaches. Headache decreased with Toradol. Most headaches have a benign origin. Conservative measures discussed including rest, avoid bright lights or loud noises, and over-the-counter analgesics (Tylenol or Ibuprofen). Keep a headache journal to identify potential triggers such as certain foods or stress. See your primary care provider if symptoms do not improve in 3 days. Seek emergency care if you develop severe headache, stiff neck, vision changes, loss of consciousness, dizziness, or confusion.     Stomach pain:  Patient was educated on the natural course of condition.  Acid reflux occurs when the acid un your stomach backs up into the esophagus. Most common symptoms include heartburn, acidic taste in mouth, and nausea. Conservative measures discussed including eating food slowly, avoiding food 2 hours prior to bed, avoiding reflux triggering foods (spicy foods, coffee, or alcohol), raising the head of the bed, and acid reducers. Return stool test tomorrow to test for H Pylori.  See your primary care provider if symptoms worsen or do not improve in 7 days. Seek emergency care if you develop severe abdominal pain, or fever. Patient verbalized understanding and is agreeable to plan. The patient was discharged ambulatory and in stable condition.    Patient verbalized understanding and is agreeable to plan. The patient was discharged ambulatory and in stable condition.    Lara Morgan PA-C ....................  4/18/2021   1:51 PM

## 2021-04-18 NOTE — PATIENT INSTRUCTIONS
Headache:  Patient was educated on the natural course of headaches. Most headaches have a benign origin. Conservative measures discussed including rest, avoid bright lights or loud noises, and over-the-counter analgesics (Tylenol or Ibuprofen). Keep a headache journal to identify potential triggers such as certain foods or stress. See your primary care provider if symptoms do not improve in 3 days. Seek emergency care if you develop severe headache, stiff neck, vision changes, loss of consciousness, dizziness, or confusion.     Stomach pain:  Patient was educated on the natural course of condition.  Acid reflux occurs when the acid un your stomach backs up into the esophagus. Most common symptoms include heartburn, acidic taste in mouth, and nausea. Conservative measures discussed including eating food slowly, avoiding food 2 hours prior to bed, avoiding reflux triggering foods (spicy foods, coffee, or alcohol), raising the head of the bed, and over-the-counter acid reducers. Return stool test tomorrow to test for H Pylori.  See your primary care provider if symptoms worsen or do not improve in 7 days. Seek emergency care if you develop severe abdominal pain, or fever. Patient verbalized understanding and is agreeable to plan. The patient was discharged ambulatory and in stable condition.

## 2021-04-30 ENCOUNTER — TELEPHONE (OUTPATIENT)
Dept: DERMATOLOGY | Facility: CLINIC | Age: 21
End: 2021-04-30

## 2021-04-30 ENCOUNTER — OFFICE VISIT (OUTPATIENT)
Dept: DERMATOLOGY | Facility: CLINIC | Age: 21
End: 2021-04-30
Payer: COMMERCIAL

## 2021-04-30 VITALS — HEART RATE: 109 BPM | DIASTOLIC BLOOD PRESSURE: 73 MMHG | SYSTOLIC BLOOD PRESSURE: 115 MMHG | OXYGEN SATURATION: 100 %

## 2021-04-30 DIAGNOSIS — L70.0 ACNE VULGARIS: ICD-10-CM

## 2021-04-30 DIAGNOSIS — Z51.81 THERAPEUTIC DRUG MONITORING: Primary | ICD-10-CM

## 2021-04-30 LAB — HCG UR QL: NEGATIVE

## 2021-04-30 PROCEDURE — 99214 OFFICE O/P EST MOD 30 MIN: CPT | Performed by: PHYSICIAN ASSISTANT

## 2021-04-30 PROCEDURE — 81025 URINE PREGNANCY TEST: CPT | Performed by: PHYSICIAN ASSISTANT

## 2021-04-30 RX ORDER — LISDEXAMFETAMINE DIMESYLATE 10 MG/1
CAPSULE ORAL
COMMUNITY
Start: 2021-04-22 | End: 2022-01-05

## 2021-04-30 NOTE — TELEPHONE ENCOUNTER
----- Message from Christal Schmid PA-C sent at 4/30/2021  3:47 PM CDT -----  UCG negative; follow-up in 30+ days as discussed

## 2021-04-30 NOTE — LETTER
4/30/2021         RE: Emiliana Helms  [address] [facility] [address]        Dear Colleague,    Thank you for referring your patient, Emiliana Helms, to the [facility]. Please see a copy of my visit note below.    HPI:   CC: Emiliana Helms is a pleasant 20 year old female who presents for recheck of acne. She did 30 days of accutane and then ran out. She had a difficult time getting an appointment and it has been 2 months since she has been seen. Her acne cleared very well and she did not have any adverse effects.     Current Outpatient Medications   Medication Sig Dispense Refill     albuterol (PROAIR HFA/PROVENTIL HFA/VENTOLIN HFA) 108 (90 Base) MCG/ACT inhaler Inhale 2 puffs into the lungs every 4 hours as needed for shortness of breath / dyspnea or wheezing PROFILE FOR FUTURE REFILLS; DISPENSE GENERIC ALBUTEROL HFA OR WHATEVER BRAND PREFERRED BY INSURANCE 18 g 11     busPIRone (BUSPAR) 5 MG tablet Take 5 mg by mouth 2 times daily       VYVANSE 10 MG capsule        cetirizine (ZYRTEC) 10 MG tablet Take 1 tablet (10 mg) by mouth daily (Patient not taking: Reported on 4/30/2021) 90 tablet 3     clindamycin (CLINDAMAX) 1 % external gel APPLY TO CLEAN, DRY SKIN IN THE MORNING       escitalopram (LEXAPRO) 20 MG tablet TAKE ONE Tablet BY MOUTH EVERY DAY with a 5mg tablet       escitalopram (LEXAPRO) 5 MG tablet take 1 TABLET by mouth DAILY with a 20mg tablet       famotidine (PEPCID) 20 MG tablet Take 1 tablet (20 mg) by mouth 2 times daily (Patient not taking: Reported on 4/30/2021) 60 tablet 0     fluticasone (FLONASE) 50 MCG/ACT nasal spray Spray 1 spray into both nostrils daily (Patient not taking: Reported on 4/30/2021) 16 g 3     ISOtretinoin (ABSORICA) 30 MG capsule Take 1 capsule (30 mg) by mouth daily (Patient not taking: Reported on 4/30/2021) 30 capsule 0     tretinoin (RETIN-A) 0.05 % external cream APPLY TO CLEAN DRY SKIN ONCE DAILY BEFORE BEDTIME        Allergies   Allergen Reactions     Zithromax [Azithromycin Dihydrate] Hives     Denies any other skin complaints, in general feels well: Yes  Review of symptoms otherwise negative:Yes    PHYSICAL EXAM:   A&Ox3: Yes   Well developed/well nourished female Yes   Mood appropriate Yes      /73   Pulse 109   SpO2 100%   Breastfeeding No   Type 6 skin. Mood appropriate  Alert and Oriented X 3. Well developed, well nourished in no distress.  General appearance: Normal  Head including face: Normal  Eyes: conjunctiva and lids: Normal  Mouth: Lips, teeth, gums: Normal  Neck: Normal  Back:   Cardiovascular: Exam of peripheral vascular system by observation for swelling, varicosities, edema: Normal  Extremities: digits/nails (clubbing): Normal  Right upper extremity: Normal  Left upper extremity: Normal  Right lower extremity: Normal  Left lower extremity: Normal  Skin: Scalp and body hair: See below     Comedones Papules/Pustules Cysts Staining Scarring   Face/Neck 1+ 0 0 0 0   Chest 0 0 0 0 0   Back 0 0 0 0 0     Telangiectasias: No Fixed Erythema: No Exoriations: No   Other Physical Exam Findings:    ASSESSMENT & PLAN:     1)  acne vulgaris, medication monitoring, PIH - will restart 30 day waiting period. No adverse effects from 1st month and her acne has been responding very well.     lexapro and buspar for anxiety and depression. A letter of clearance on file from Rachel Irby from [facility].    Standing CBC, CMP, and fasting lipids (for females include urine pregnancy test)  Ipledge reviewed with patient and Ipledge consent form complete  Patient place in ipledge system: yes 2/17/21  Prescription sent to pharmacy: yes 2/17/21  Ipledge: [id number]  Target:[id number] at 150-200mg/kg  Total: 900 mg  Weight 145lb  Birth control:  abstinence  Urine pregnancy test: neg 2/17/21  Return to clinic 30 days  Isotretinoin (Accutane) education:  Do not share medication, do not donate blood, and do not get  pregnant while on accutane.  While on Accutane: do not use other topical acne medications. Do not share medication. Do not get pregnant (including one month after stopping medication). Do not donate blood. Do not wax. Do not get laser, peels, or aggressive facials while on Accutane of for 6 months after.   Females must  their prescription within 7 days of having urine pregnancy test.  Dry lips and mouth, minor swelling of the eyelids or lips, crusty skin, nosebleeds, GI upset, or thinning of hair may occur. If any of these effects persist or worsen, tell your doctor or pharmacist promptly.   To relieve dry mouth, suck on (sugarless) hard candy or ice chips, chew (sugarless) gum, drink water.   Remember that your doctor has prescribed this medication because he or she has judged that the benefit to you is greater than the risk of side effects. Many people using this medication do not have serious side effects.   Contact office immediately if you have any of these unlikely but serious side effects: mental/mood changes (e.g., depression,  aggressive or violent behavior, and in rare cases, thoughts of suicide), tingling feeling in the skin, quick/severe sun sensitivity, back/joint/muscle pain, signs of infection (e.g., fever, persistent sore throat, painful swallowing, peeling skin on palms/soles.   Isotretinoin may infrequently cause disease of the pancreatitis, that may rarely be fatal. Stop taking this medication and contact office immediately if you develop: severe stomach pain severe or persistent GI upset,   Stop taking this medication and tell your doctor immediately if you develop these unlikely but very serious side effects: severe headache, vision changes, ear ringing, hearling loss, chest pain, yellowing eyes, skin, dark urine, severe diarrhea, rectal bleeding,   Seek immediate medical attention if you notice any symptoms of a serious allergic reaction.  Wait 6 months after stopping accutane before  getting piercing, tattoos, and/or laser treatment.    Accutane is discussed fully with the patient. It is a very effective drug to treat acne vulgaris but has many potential significant side effects. Chief among these are teratogensis, hepatic injury, dyslipidemia and severe drying of the mucous membranes. All of these issues have been discussed in details. Monthly blood tests to monitor lipids and liver functions will be necessary. Expect painful dryness and/or fissuring around the lips, eyes, and other moist areas of the body. Balms may be protective. Contact lens may be too painful to wear temporarily while on this drug. Episodes of significant depression have been reported, including suicidal ideation and attempts in rare cases. It may also cause pseudotumor cerebri and hyperostosis. The patient will report any such changes in mood, depressive symptoms or suicidal thoughts, headaches, joint or bone pains. There is also a possible association with inflammatory bowel disease, although this is unproven at this point.          Pt advised on use and risks including photosensitivity, allergic reactions, GI upset, headaches, nausea, erythema, scaling, vertigo, asthralgias, blood clots:Yes    Follow-up: 30+ days  CC:   Scribed By: Christal Schmid, MS, PASHABNAM          Again, thank you for allowing me to participate in the care of your patient.        Sincerely,        Christal Schmid PA-C

## 2021-04-30 NOTE — PROGRESS NOTES
HPI:   CC: Emiliana Helms is a pleasant 20 year old female who presents for recheck of acne. She did 30 days of accutane and then ran out. She had a difficult time getting an appointment and it has been 2 months since she has been seen. Her acne cleared very well and she did not have any adverse effects.     Current Outpatient Medications   Medication Sig Dispense Refill     albuterol (PROAIR HFA/PROVENTIL HFA/VENTOLIN HFA) 108 (90 Base) MCG/ACT inhaler Inhale 2 puffs into the lungs every 4 hours as needed for shortness of breath / dyspnea or wheezing PROFILE FOR FUTURE REFILLS; DISPENSE GENERIC ALBUTEROL HFA OR WHATEVER BRAND PREFERRED BY INSURANCE 18 g 11     busPIRone (BUSPAR) 5 MG tablet Take 5 mg by mouth 2 times daily       VYVANSE 10 MG capsule        cetirizine (ZYRTEC) 10 MG tablet Take 1 tablet (10 mg) by mouth daily (Patient not taking: Reported on 4/30/2021) 90 tablet 3     clindamycin (CLINDAMAX) 1 % external gel APPLY TO CLEAN, DRY SKIN IN THE MORNING       escitalopram (LEXAPRO) 20 MG tablet TAKE ONE Tablet BY MOUTH EVERY DAY with a 5mg tablet       escitalopram (LEXAPRO) 5 MG tablet take 1 TABLET by mouth DAILY with a 20mg tablet       famotidine (PEPCID) 20 MG tablet Take 1 tablet (20 mg) by mouth 2 times daily (Patient not taking: Reported on 4/30/2021) 60 tablet 0     fluticasone (FLONASE) 50 MCG/ACT nasal spray Spray 1 spray into both nostrils daily (Patient not taking: Reported on 4/30/2021) 16 g 3     ISOtretinoin (ABSORICA) 30 MG capsule Take 1 capsule (30 mg) by mouth daily (Patient not taking: Reported on 4/30/2021) 30 capsule 0     tretinoin (RETIN-A) 0.05 % external cream APPLY TO CLEAN DRY SKIN ONCE DAILY BEFORE BEDTIME       Allergies   Allergen Reactions     Zithromax [Azithromycin Dihydrate] Hives     Denies any other skin complaints, in general feels well: Yes  Review of symptoms otherwise negative:Yes    PHYSICAL EXAM:   A&Ox3: Yes   Well developed/well nourished female Yes   Mood  appropriate Yes      /73   Pulse 109   SpO2 100%   Breastfeeding No   Type 6 skin. Mood appropriate  Alert and Oriented X 3. Well developed, well nourished in no distress.  General appearance: Normal  Head including face: Normal  Eyes: conjunctiva and lids: Normal  Mouth: Lips, teeth, gums: Normal  Neck: Normal  Back:   Cardiovascular: Exam of peripheral vascular system by observation for swelling, varicosities, edema: Normal  Extremities: digits/nails (clubbing): Normal  Right upper extremity: Normal  Left upper extremity: Normal  Right lower extremity: Normal  Left lower extremity: Normal  Skin: Scalp and body hair: See below     Comedones Papules/Pustules Cysts Staining Scarring   Face/Neck 1+ 0 0 0 0   Chest 0 0 0 0 0   Back 0 0 0 0 0     Telangiectasias: No Fixed Erythema: No Exoriations: No   Other Physical Exam Findings:    ASSESSMENT & PLAN:     1)  acne vulgaris, medication monitoring, PIH - will restart 30 day waiting period. No adverse effects from 1st month and her acne has been responding very well.     lexapro and buspar for anxiety and depression. A letter of clearance on file from Rahcel Irby from [facility].    Standing CBC, CMP, and fasting lipids (for females include urine pregnancy test)  Ipledge reviewed with patient and Ipledge consent form complete  Patient place in ipledge system: yes 2/17/21  Prescription sent to pharmacy: yes 2/17/21  Ipledge: [id number]  Target:[id number] at 150-200mg/kg  Total: 900 mg  Weight 145lb  Birth control:  abstinence  Urine pregnancy test: neg 2/17/21  Return to clinic 30 days  Isotretinoin (Accutane) education:  Do not share medication, do not donate blood, and do not get pregnant while on accutane.  While on Accutane: do not use other topical acne medications. Do not share medication. Do not get pregnant (including one month after stopping medication). Do not donate blood. Do not wax. Do not get laser, peels, or aggressive facials while on  Accutane of for 6 months after.   Females must  their prescription within 7 days of having urine pregnancy test.  Dry lips and mouth, minor swelling of the eyelids or lips, crusty skin, nosebleeds, GI upset, or thinning of hair may occur. If any of these effects persist or worsen, tell your doctor or pharmacist promptly.   To relieve dry mouth, suck on (sugarless) hard candy or ice chips, chew (sugarless) gum, drink water.   Remember that your doctor has prescribed this medication because he or she has judged that the benefit to you is greater than the risk of side effects. Many people using this medication do not have serious side effects.   Contact office immediately if you have any of these unlikely but serious side effects: mental/mood changes (e.g., depression,  aggressive or violent behavior, and in rare cases, thoughts of suicide), tingling feeling in the skin, quick/severe sun sensitivity, back/joint/muscle pain, signs of infection (e.g., fever, persistent sore throat, painful swallowing, peeling skin on palms/soles.   Isotretinoin may infrequently cause disease of the pancreatitis, that may rarely be fatal. Stop taking this medication and contact office immediately if you develop: severe stomach pain severe or persistent GI upset,   Stop taking this medication and tell your doctor immediately if you develop these unlikely but very serious side effects: severe headache, vision changes, ear ringing, hearling loss, chest pain, yellowing eyes, skin, dark urine, severe diarrhea, rectal bleeding,   Seek immediate medical attention if you notice any symptoms of a serious allergic reaction.  Wait 6 months after stopping accutane before getting piercing, tattoos, and/or laser treatment.    Accutane is discussed fully with the patient. It is a very effective drug to treat acne vulgaris but has many potential significant side effects. Chief among these are teratogensis, hepatic injury, dyslipidemia and severe  drying of the mucous membranes. All of these issues have been discussed in details. Monthly blood tests to monitor lipids and liver functions will be necessary. Expect painful dryness and/or fissuring around the lips, eyes, and other moist areas of the body. Balms may be protective. Contact lens may be too painful to wear temporarily while on this drug. Episodes of significant depression have been reported, including suicidal ideation and attempts in rare cases. It may also cause pseudotumor cerebri and hyperostosis. The patient will report any such changes in mood, depressive symptoms or suicidal thoughts, headaches, joint or bone pains. There is also a possible association with inflammatory bowel disease, although this is unproven at this point.          Pt advised on use and risks including photosensitivity, allergic reactions, GI upset, headaches, nausea, erythema, scaling, vertigo, asthralgias, blood clots:Yes    Follow-up: 30+ days  CC:   Scribed By: Christal Schmid, MS, PA-C

## 2021-05-03 NOTE — TELEPHONE ENCOUNTER
Called and LM for patient to call back in regards to lab results.    Nena RN-BSN-PHN  [facility]  [phone]

## 2021-05-03 NOTE — TELEPHONE ENCOUNTER
Called and spoke to patient.    Informed patient UCG negative.    Scheduled 30 day f/u appointment.    Patient voiced understanding.    CHINO Barrett-BSN-PHN  [facility]  [phone]

## 2021-07-12 ENCOUNTER — OFFICE VISIT (OUTPATIENT)
Dept: INTERNAL MEDICINE | Facility: CLINIC | Age: 21
End: 2021-07-12
Payer: COMMERCIAL

## 2021-07-12 VITALS
DIASTOLIC BLOOD PRESSURE: 60 MMHG | BODY MASS INDEX: 29.15 KG/M2 | RESPIRATION RATE: 16 BRPM | OXYGEN SATURATION: 100 % | SYSTOLIC BLOOD PRESSURE: 90 MMHG | HEART RATE: 79 BPM | TEMPERATURE: 98 F | WEIGHT: 175.2 LBS

## 2021-07-12 DIAGNOSIS — J01.90 ACUTE SINUSITIS WITH SYMPTOMS GREATER THAN 10 DAYS: ICD-10-CM

## 2021-07-12 DIAGNOSIS — F32.0 MILD MAJOR DEPRESSION (H): Primary | ICD-10-CM

## 2021-07-12 DIAGNOSIS — L70.9 ACNE, UNSPECIFIED ACNE TYPE: ICD-10-CM

## 2021-07-12 PROCEDURE — 99213 OFFICE O/P EST LOW 20 MIN: CPT | Performed by: INTERNAL MEDICINE

## 2021-07-12 RX ORDER — BUPROPION HYDROCHLORIDE 150 MG/1
150 TABLET ORAL EVERY MORNING
Qty: 30 TABLET | Refills: 11 | Status: SHIPPED | OUTPATIENT
Start: 2021-07-12 | End: 2021-11-18

## 2021-07-12 RX ORDER — AMOXICILLIN 500 MG/1
500 CAPSULE ORAL 3 TIMES DAILY
Qty: 21 CAPSULE | Refills: 0 | Status: SHIPPED | OUTPATIENT
Start: 2021-07-12 | End: 2021-07-19

## 2021-07-12 RX ORDER — TRETINOIN 0.5 MG/G
CREAM TOPICAL
Qty: 45 G | Refills: 11 | Status: SHIPPED | OUTPATIENT
Start: 2021-07-12 | End: 2021-11-18

## 2021-07-12 ASSESSMENT — ANXIETY QUESTIONNAIRES
1. FEELING NERVOUS, ANXIOUS, OR ON EDGE: NEARLY EVERY DAY
5. BEING SO RESTLESS THAT IT IS HARD TO SIT STILL: NEARLY EVERY DAY
GAD7 TOTAL SCORE: 21
IF YOU CHECKED OFF ANY PROBLEMS ON THIS QUESTIONNAIRE, HOW DIFFICULT HAVE THESE PROBLEMS MADE IT FOR YOU TO DO YOUR WORK, TAKE CARE OF THINGS AT HOME, OR GET ALONG WITH OTHER PEOPLE: EXTREMELY DIFFICULT
3. WORRYING TOO MUCH ABOUT DIFFERENT THINGS: NEARLY EVERY DAY
6. BECOMING EASILY ANNOYED OR IRRITABLE: NEARLY EVERY DAY
7. FEELING AFRAID AS IF SOMETHING AWFUL MIGHT HAPPEN: NEARLY EVERY DAY
2. NOT BEING ABLE TO STOP OR CONTROL WORRYING: NEARLY EVERY DAY

## 2021-07-12 ASSESSMENT — PATIENT HEALTH QUESTIONNAIRE - PHQ9
5. POOR APPETITE OR OVEREATING: NEARLY EVERY DAY
SUM OF ALL RESPONSES TO PHQ QUESTIONS 1-9: 20

## 2021-07-12 NOTE — PROGRESS NOTES
"    Assessment & Plan     Mild major depression (H)  Restart Wellbutrin  - buPROPion (WELLBUTRIN XL) 150 MG 24 hr tablet; Take 1 tablet (150 mg) by mouth every morning    Acute sinusitis with symptoms greater than 10 days  Discussed symptomatic therapy of postnasal drip including nasal steroid spray and use of buffered saline lavage.  Treat for sinus infection given duration of symptoms.  - amoxicillin (AMOXIL) 500 MG capsule; Take 1 capsule (500 mg) by mouth 3 times daily for 7 days    Acne, unspecified acne type    - tretinoin (RETIN-A) 0.05 % external cream; APPLY TO CLEAN DRY SKIN ONCE DAILY BEFORE BEDTIME             BMI:   Estimated body mass index is 29.15 kg/m  as calculated from the following:    Height as of 4/18/21: 1.651 m (5' 5\").    Weight as of this encounter: 79.5 kg (175 lb 3.2 oz).   Weight management plan: Discussed healthy diet and exercise guidelines    Depression Screening Follow Up    PHQ 7/12/2021   PHQ-9 Total Score 20   Q9: Thoughts of better off dead/self-harm past 2 weeks Not at all            See Patient Instructions    Return in about 2 weeks (around 7/26/2021) for recheck, if symptoms fail to improve.    Benito Sheridan MD  [facility]   Emiliana is a 20 year old who presents for the following health issues     HPI     Acute Illness  Acute illness concerns: sore throat  Onset/Duration: 2 weeks  Symptoms:  Fever: no  Chills/Sweats: no  Headache (location?): no  Sinus Pressure: YES  Conjunctivitis:  no  Ear Pain: no  Rhinorrhea: YES either dry or runny  Congestion: YES  Sore Throat: YES  Cough: YES-productive of yellow sputum  Wheeze: YES  Decreased Appetite: no  Nausea: no  Vomiting: no  Diarrhea: no  Dysuria/Freq.: no  Dysuria or Hematuria: no  Fatigue/Achiness: YES- achy back and shoulders  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  Therapies tried and outcome: None  Abnormal Mood " Symptoms  Onset/Duration: depression/anxiety  Description: for a while used to take vyvanse   Depression (if yes, do PHQ-9): YES  Anxiety (if yes, do TERESA-7): YES  Accompanying Signs & Symptoms:  Still participating in activities that you used to enjoy: YES- participating but, not enjoy it  Fatigue: YES  Irritability: YES  Difficulty concentrating: YES  Changes in appetite: YES over eating  Problems with sleep: YES  Heart racing/beating fast: YES  Abnormally elevated, expansive, or irritable mood: YES  Persistently increased activity or energy: YES- sometimes have burst of energy  Thoughts of hurting yourself or others: no  History:  Recent stress or major life event: no  Prior depression or anxiety: yes both  Family history of depression or anxiety: no  Alcohol/drug use: no  Difficulty sleeping: YES  Precipitating or alleviating factors: None  Therapies tried and outcome: none  PHQ 7/31/2019 7/12/2021   PHQ-9 Total Score 21 20   Q9: Thoughts of better off dead/self-harm past 2 weeks Not at all Not at all     TERESA-7 SCORE 7/12/2021   Total Score 21         As above.  Complaining of 2 weeks of upper respiratory symptoms, including sinus congestion with postnasal drip, cough productive of yellow sputum, facial tenderness.  Has had chronic problems with allergies.    Interested in restarting Wellbutrin, which she has used for depression in the past.  Today's PHQ 9 noted.      Review of Systems   Constitutional, HEENT, cardiovascular, pulmonary, GI, , musculoskeletal, neuro, skin, endocrine and psych systems are negative, except as otherwise noted.      Objective    BP 90/60 (BP Location: Left arm, Patient Position: Chair, Cuff Size: Adult Large)   Pulse 79   Temp 98  F (36.7  C) (Temporal)   Resp 16   Wt 79.5 kg (175 lb 3.2 oz)   LMP 07/07/2021   SpO2 100%   BMI 29.15 kg/m    Body mass index is 29.15 kg/m .  Physical Exam   GENERAL: healthy, alert and no distress  NECK: no adenopathy, no asymmetry, masses, or  scars and thyroid normal to palpation  RESP: lungs clear to auscultation - no rales, rhonchi or wheezes  CV: regular rate and rhythm, normal S1 S2, no S3 or S4, no murmur, click or rub, no peripheral edema and peripheral pulses strong  ABDOMEN: soft, nontender, no hepatosplenomegaly, no masses and bowel sounds normal  MS: no gross musculoskeletal defects noted, no edema

## 2021-07-12 NOTE — PATIENT INSTRUCTIONS
- Use the Flonase nasal spray nightly, before bed.  (Available over-the-counter)  - Perform the nasal saline rinse at least once daily, right before giving yourself the medicated nasal spray.  - Take the antibiotic as prescribed.  (Prescription has been sent to your pharmacy)  - Get plenty of rest and fluids

## 2021-07-13 ASSESSMENT — ANXIETY QUESTIONNAIRES: GAD7 TOTAL SCORE: 21

## 2021-08-09 ENCOUNTER — OFFICE VISIT (OUTPATIENT)
Dept: URGENT CARE | Facility: URGENT CARE | Age: 21
End: 2021-08-09
Payer: COMMERCIAL

## 2021-08-09 VITALS
BODY MASS INDEX: 29.12 KG/M2 | WEIGHT: 175 LBS | TEMPERATURE: 98 F | SYSTOLIC BLOOD PRESSURE: 108 MMHG | RESPIRATION RATE: 20 BRPM | HEART RATE: 80 BPM | OXYGEN SATURATION: 100 % | DIASTOLIC BLOOD PRESSURE: 60 MMHG

## 2021-08-09 DIAGNOSIS — N89.8 VAGINAL ITCHING: Primary | ICD-10-CM

## 2021-08-09 DIAGNOSIS — R30.0 DYSURIA: ICD-10-CM

## 2021-08-09 DIAGNOSIS — N89.8 VAGINAL ODOR: ICD-10-CM

## 2021-08-09 LAB
ALBUMIN UR-MCNC: NEGATIVE MG/DL
APPEARANCE UR: CLEAR
BILIRUB UR QL STRIP: NEGATIVE
CLUE CELLS: ABNORMAL
COLOR UR AUTO: YELLOW
GLUCOSE UR STRIP-MCNC: NEGATIVE MG/DL
HGB UR QL STRIP: NEGATIVE
KETONES UR STRIP-MCNC: NEGATIVE MG/DL
LEUKOCYTE ESTERASE UR QL STRIP: NEGATIVE
NITRATE UR QL: NEGATIVE
PH UR STRIP: 6.5 [PH] (ref 5–7)
SP GR UR STRIP: 1.01 (ref 1–1.03)
TRICHOMONAS, WET PREP: ABNORMAL
UROBILINOGEN UR STRIP-ACNC: 0.2 E.U./DL
WBC'S/HIGH POWER FIELD, WET PREP: ABNORMAL
YEAST, WET PREP: ABNORMAL

## 2021-08-09 PROCEDURE — 87210 SMEAR WET MOUNT SALINE/INK: CPT | Performed by: PHYSICIAN ASSISTANT

## 2021-08-09 PROCEDURE — 99214 OFFICE O/P EST MOD 30 MIN: CPT | Performed by: PHYSICIAN ASSISTANT

## 2021-08-09 PROCEDURE — 81003 URINALYSIS AUTO W/O SCOPE: CPT | Performed by: PHYSICIAN ASSISTANT

## 2021-08-09 PROCEDURE — 87086 URINE CULTURE/COLONY COUNT: CPT | Performed by: PHYSICIAN ASSISTANT

## 2021-08-09 RX ORDER — FLUCONAZOLE 150 MG/1
150 TABLET ORAL ONCE
Qty: 1 TABLET | Refills: 1 | Status: SHIPPED | OUTPATIENT
Start: 2021-08-09 | End: 2021-08-09

## 2021-08-09 RX ORDER — METRONIDAZOLE 7.5 MG/G
1 GEL VAGINAL AT BEDTIME
Qty: 70 G | Refills: 0 | Status: SHIPPED | OUTPATIENT
Start: 2021-08-09 | End: 2021-08-14

## 2021-08-09 NOTE — PROGRESS NOTES
Assessment & Plan     Vaginal itching  Wet prep neg  Will treat with diflucan for possible yeast infection  Patient declines STD testing  - Wet prep - Clinic Collect  - fluconazole (DIFLUCAN) 150 MG tablet; Take 1 tablet (150 mg) by mouth once for 1 dose    Dysuria  UA neg  Urine culture pendnig  - UA Macro with Reflex to Micro and Culture - lab collect; Future  - UA Macro with Reflex to Micro and Culture - lab collect  - Urine Culture Aerobic Bacterial - lab collect; Future    Vaginal odor  metrogel for vaginal odor  Patient declines STD testing  She also states she does not have any retained foreign body in vagina  - metroNIDAZOLE (METROGEL) 0.75 % vaginal gel; Place 1 applicator (5 g) vaginally At Bedtime for 5 days         No follow-ups on file.    Phil Kerr PA-C  [facility] Hopson is a 20 year old who presents for the following health issues     HPI     Vaginal odor  Vaginal itching    Review of Systems   Constitutional, HEENT, cardiovascular, pulmonary, gi and gu systems are negative, except as otherwise noted.      Objective    /60   Pulse 80   Temp 98  F (36.7  C)   Resp 20   Wt 79.4 kg (175 lb)   LMP  (LMP Unknown)   SpO2 100%   BMI 29.12 kg/m    Body mass index is 29.12 kg/m .  Physical Exam   GENERAL: healthy, alert and no distress  RESP: lungs clear to auscultation - no rales, rhonchi or wheezes  CV: regular rate and rhythm, normal S1 S2, no S3 or S4, no murmur, click or rub, no peripheral edema and peripheral pulses strong  ABDOMEN: soft, nontender, no hepatosplenomegaly, no masses and bowel sounds normal   (female): deferred  MS: no gross musculoskeletal defects noted, no edema  SKIN: no suspicious lesions or rashes  NEURO: Normal strength and tone, mentation intact and speech normal  PSYCH: mentation appears normal, affect normal/bright    Results for orders placed or performed in visit on 08/09/21   UA Macro with Reflex to Micro and  Culture - lab collect     Status: Normal    Specimen: Urine, Midstream   Result Value Ref Range    Color Urine Yellow Colorless, Straw, Light Yellow, Yellow    Appearance Urine Clear Clear    Glucose Urine Negative Negative mg/dL    Bilirubin Urine Negative Negative    Ketones Urine Negative Negative mg/dL    Specific Gravity Urine 1.015 1.003 - 1.035    Blood Urine Negative Negative    pH Urine 6.5 5.0 - 7.0    Protein Albumin Urine Negative Negative mg/dL    Urobilinogen Urine 0.2 0.2, 1.0 E.U./dL    Nitrite Urine Negative Negative    Leukocyte Esterase Urine Negative Negative    Narrative    Microscopic not indicated   Wet prep - Clinic Collect     Status: Abnormal    Specimen: Vagina; Swab   Result Value Ref Range    Trichomonas Absent Absent    Yeast Absent Absent    Clue Cells Absent Absent    WBCs/high power field 2+ (A) None

## 2021-08-10 LAB — BACTERIA UR CULT: NORMAL

## 2021-11-18 ENCOUNTER — OFFICE VISIT (OUTPATIENT)
Dept: FAMILY MEDICINE | Facility: CLINIC | Age: 21
End: 2021-11-18
Payer: COMMERCIAL

## 2021-11-18 VITALS
SYSTOLIC BLOOD PRESSURE: 109 MMHG | OXYGEN SATURATION: 99 % | HEART RATE: 85 BPM | HEIGHT: 65 IN | WEIGHT: 166.1 LBS | DIASTOLIC BLOOD PRESSURE: 73 MMHG | TEMPERATURE: 97.3 F | BODY MASS INDEX: 27.67 KG/M2

## 2021-11-18 DIAGNOSIS — L70.0 ACNE VULGARIS: ICD-10-CM

## 2021-11-18 DIAGNOSIS — N89.8 VAGINAL DISCHARGE: Primary | ICD-10-CM

## 2021-11-18 DIAGNOSIS — F32.0 CURRENT MILD EPISODE OF MAJOR DEPRESSIVE DISORDER WITHOUT PRIOR EPISODE (H): ICD-10-CM

## 2021-11-18 PROBLEM — F32.9 MAJOR DEPRESSION, SINGLE EPISODE: Status: ACTIVE | Noted: 2021-11-18

## 2021-11-18 LAB

## 2021-11-18 PROCEDURE — 99214 OFFICE O/P EST MOD 30 MIN: CPT | Performed by: PHYSICIAN ASSISTANT

## 2021-11-18 PROCEDURE — 87210 SMEAR WET MOUNT SALINE/INK: CPT | Performed by: PHYSICIAN ASSISTANT

## 2021-11-18 PROCEDURE — 81003 URINALYSIS AUTO W/O SCOPE: CPT | Performed by: PHYSICIAN ASSISTANT

## 2021-11-18 RX ORDER — METRONIDAZOLE 500 MG/1
500 TABLET ORAL 2 TIMES DAILY
Qty: 14 TABLET | Refills: 0 | Status: SHIPPED | OUTPATIENT
Start: 2021-11-18 | End: 2021-11-25

## 2021-11-18 RX ORDER — BUSPIRONE HYDROCHLORIDE 5 MG/1
5 TABLET ORAL 2 TIMES DAILY
Qty: 60 TABLET | Refills: 3 | Status: SHIPPED | OUTPATIENT
Start: 2021-11-18

## 2021-11-18 RX ORDER — SPIRONOLACTONE 50 MG/1
50-100 TABLET, FILM COATED ORAL DAILY
Qty: 60 TABLET | Refills: 3 | Status: SHIPPED | OUTPATIENT
Start: 2021-11-18 | End: 2022-04-05

## 2021-11-18 RX ORDER — FLUCONAZOLE 150 MG/1
150 TABLET ORAL ONCE
Qty: 1 TABLET | Refills: 0 | Status: SHIPPED | OUTPATIENT
Start: 2021-11-18 | End: 2021-11-18

## 2021-11-18 RX ORDER — BUPROPION HYDROCHLORIDE 300 MG/1
300 TABLET ORAL EVERY MORNING
Qty: 90 TABLET | Refills: 1 | Status: SHIPPED | OUTPATIENT
Start: 2021-11-18 | End: 2022-04-05

## 2021-11-18 ASSESSMENT — ASTHMA QUESTIONNAIRES
QUESTION_1 LAST FOUR WEEKS HOW MUCH OF THE TIME DID YOUR ASTHMA KEEP YOU FROM GETTING AS MUCH DONE AT WORK, SCHOOL OR AT HOME: NONE OF THE TIME
ACT_TOTALSCORE: 25
QUESTION_5 LAST FOUR WEEKS HOW WOULD YOU RATE YOUR ASTHMA CONTROL: COMPLETELY CONTROLLED
QUESTION_3 LAST FOUR WEEKS HOW OFTEN DID YOUR ASTHMA SYMPTOMS (WHEEZING, COUGHING, SHORTNESS OF BREATH, CHEST TIGHTNESS OR PAIN) WAKE YOU UP AT NIGHT OR EARLIER THAN USUAL IN THE MORNING: NOT AT ALL
QUESTION_2 LAST FOUR WEEKS HOW OFTEN HAVE YOU HAD SHORTNESS OF BREATH: NOT AT ALL
QUESTION_4 LAST FOUR WEEKS HOW OFTEN HAVE YOU USED YOUR RESCUE INHALER OR NEBULIZER MEDICATION (SUCH AS ALBUTEROL): NOT AT ALL

## 2021-11-18 ASSESSMENT — ANXIETY QUESTIONNAIRES
IF YOU CHECKED OFF ANY PROBLEMS ON THIS QUESTIONNAIRE, HOW DIFFICULT HAVE THESE PROBLEMS MADE IT FOR YOU TO DO YOUR WORK, TAKE CARE OF THINGS AT HOME, OR GET ALONG WITH OTHER PEOPLE: SOMEWHAT DIFFICULT
2. NOT BEING ABLE TO STOP OR CONTROL WORRYING: SEVERAL DAYS
6. BECOMING EASILY ANNOYED OR IRRITABLE: NOT AT ALL
GAD7 TOTAL SCORE: 8
5. BEING SO RESTLESS THAT IT IS HARD TO SIT STILL: NOT AT ALL
1. FEELING NERVOUS, ANXIOUS, OR ON EDGE: NEARLY EVERY DAY
7. FEELING AFRAID AS IF SOMETHING AWFUL MIGHT HAPPEN: SEVERAL DAYS
3. WORRYING TOO MUCH ABOUT DIFFERENT THINGS: MORE THAN HALF THE DAYS

## 2021-11-18 ASSESSMENT — MIFFLIN-ST. JEOR: SCORE: 1524.3

## 2021-11-18 ASSESSMENT — PATIENT HEALTH QUESTIONNAIRE - PHQ9: 5. POOR APPETITE OR OVEREATING: SEVERAL DAYS

## 2021-11-18 NOTE — PATIENT INSTRUCTIONS
Trial of Buspar 5-10 mg nightly then as needed if needed/tolerate for increased anxiety - max dose 30 mg two times a day.        Did you know?      You can schedule a video visit for follow-up appointments as well as future appointments for certain conditions.  Please see the below link.     [email or web address]    If you have not already done so,  I encourage you to sign up for [email or web address] ([email or web address]  This will allow you to review your results, securely communicate with a provider, and schedule virtual visits as well.

## 2021-11-18 NOTE — PROGRESS NOTES
Assessment & Plan     Vaginal discharge  Unclear etiology - previously presumptively treated, will recheck today and option for presumptive oral treatment discussed with patient. Follow up with gyne if still no improvement in symptoms.  - Wet preparation; Future  - UA reflex to Microscopic and Culture; Future    Current mild episode of major depressive disorder without prior episode (H)  Long standing, chronic, controlled overall but could be better - increase wellbutrin to 300mg daily with option for as needed buspar  - buPROPion (WELLBUTRIN XL) 300 MG 24 hr tablet; Take 1 tablet (300 mg) by mouth every morning  - busPIRone (BUSPAR) 5 MG tablet; Take 1 tablet (5 mg) by mouth 2 times daily    Acne vulgaris  Ready to get back on accutane if possible, but derm booked out, wondering about options in the mean time; previous topicals not well covered by insurance.  - spironolactone (ALDACTONE) 50 MG tablet; Take 1-2 tablets ( mg) by mouth daily    Review of prior external note(s) from - previous routine and acute notes  25 minutes spent on the date of the encounter doing chart review, history and exam, documentation and further activities per the note       Patient Instructions   Trial of Buspar 5-10 mg nightly then as needed if needed/tolerate for increased anxiety - max dose 30 mg two times a day.        Did you know?      You can schedule a video visit for follow-up appointments as well as future appointments for certain conditions.  Please see the below link.     [email or web address]    If you have not already done so,  I encourage you to sign up for [facility] ([email or web address]).  This will allow you to review your results, securely communicate with a provider, and schedule virtual visits as well.      Return in about 3 months (around 2/18/2022) for Follow up, with PCP, or sooner with worsening symptoms.    MUSTAPHA Klein [facility]  [facility]    Malvin Hopson is a 20 year old who presents for the following health issues     History of Present Illness       She eats 2-3 servings of fruits and vegetables daily.She consumes 0 sweetened beverage(s) daily.She exercises with enough effort to increase her heart rate 9 or less minutes per day.  She exercises with enough effort to increase her heart rate 3 or less days per week.   She is taking medications regularly.       Depression and Anxiety Follow-Up    How are you doing with your depression since your last visit? Improved     How are you doing with your anxiety since your last visit?  No change    Are you having other symptoms that might be associated with depression or anxiety? No    Have you had a significant life event? No     Do you have any concerns with your use of alcohol or other drugs? No     Patient taking Wellbutrin  mg daily with overall good results and no issues but wondering about slight increase in medicine.    Social History     Tobacco Use     Smoking status: Never Smoker     Smokeless tobacco: Never Used     Tobacco comment: non smoking home   Substance Use Topics     Alcohol use: No     Alcohol/week: 0.0 standard drinks     Comment: 9/2/16LW     Drug use: No     Comment: 9/2/16LW     PHQ 7/31/2019 7/12/2021 11/18/2021   PHQ-9 Total Score 21 20 2   Q9: Thoughts of better off dead/self-harm past 2 weeks Not at all Not at all Not at all     TERESA-7 SCORE 7/12/2021 11/18/2021   Total Score 21 8     Last PHQ-9 11/18/2021   1.  Little interest or pleasure in doing things 1   2.  Feeling down, depressed, or hopeless 0   3.  Trouble falling or staying asleep, or sleeping too much 0   4.  Feeling tired or having little energy 0   5.  Poor appetite or overeating 0   6.  Feeling bad about yourself 0   7.  Trouble concentrating 1   8.  Moving slowly or restless 0   Q9: Thoughts of better off dead/self-harm past 2 weeks 0   PHQ-9 Total Score 2   Difficulty at work, home, or  "with people Not difficult at all       Suicide Assessment Five-step Evaluation and Treatment (SAFE-T)      How many servings of fruits and vegetables do you eat daily?  2-3    On average, how many sweetened beverages do you drink each day (Examples: soda, juice, sweet tea, etc.  Do NOT count diet or artificially sweetened beverages)?   0    How many days per week do you exercise enough to make your heart beat faster? 3 or less    How many minutes a day do you exercise enough to make your heart beat faster? 10 - 19    How many days per week do you miss taking your medication? 0    Vaginal Symptoms  Onset/Duration: Since August went  8/09  Description:  Vaginal Discharge: creamy   Itching (Pruritis): YES  Burning sensation:  no  Odor: YES  Accompanying Signs & Symptoms:  Urinary symptoms: no  Abdominal pain: no  Fever: no  History:   Sexually active: no  New Partner: no  Possibility of Pregnancy:  no  Recent antibiotic use: no  Previous vaginitis issues: no  Precipitating or alleviating factors: None  Therapies tried and outcome: none  Wet prep and urinalysis within normal limits.    Patient would also like to discuss acne options.  Topical clinda not helpful, topical retinA not covered; did well with accutane but unable to see dermatology for a while.    Review of Systems   Constitutional, HEENT, cardiovascular, pulmonary, GI, , musculoskeletal, neuro, skin, endocrine and psych systems are negative, except as otherwise noted.      Objective    /73   Pulse 85   Temp 97.3  F (36.3  C)   Ht 1.651 m (5' 5\")   Wt 75.3 kg (166 lb 1.6 oz)   LMP 11/10/2021   SpO2 99%   BMI 27.64 kg/m    Body mass index is 27.64 kg/m .  Physical Exam   GENERAL: healthy, alert and no distress  RESP: lungs clear to auscultation - no rales, rhonchi or wheezes  CV: regular rate and rhythm, normal S1 S2, no S3 or S4, no murmur, click or rub, no peripheral edema and peripheral pulses strong  PSYCH: mentation appears normal, affect " normal/bright    Results for orders placed or performed in visit on 11/18/21   UA reflex to Microscopic and Culture     Status: Normal    Specimen: Urine, Midstream   Result Value Ref Range    Color Urine Yellow Colorless, Straw, Light Yellow, Yellow    Appearance Urine Clear Clear    Glucose Urine Negative Negative mg/dL    Bilirubin Urine Negative Negative    Ketones Urine Negative Negative mg/dL    Specific Gravity Urine >=1.030 1.003 - 1.035    Blood Urine Negative Negative    pH Urine 5.5 5.0 - 7.0    Protein Albumin Urine Negative Negative mg/dL    Urobilinogen Urine 0.2 0.2, 1.0 E.U./dL    Nitrite Urine Negative Negative    Leukocyte Esterase Urine Negative Negative    Narrative    Microscopic not indicated   Wet preparation     Status: Abnormal    Specimen: Vagina; Swab   Result Value Ref Range    Trichomonas Absent Absent    Yeast Absent Absent    Clue Cells Present (A) Absent    WBCs/high power field 3+ (A) None

## 2021-11-19 ASSESSMENT — ANXIETY QUESTIONNAIRES: GAD7 TOTAL SCORE: 8

## 2021-11-19 ASSESSMENT — ASTHMA QUESTIONNAIRES: ACT_TOTALSCORE: 25

## 2021-11-19 ASSESSMENT — PATIENT HEALTH QUESTIONNAIRE - PHQ9: SUM OF ALL RESPONSES TO PHQ QUESTIONS 1-9: 2

## 2022-01-05 ENCOUNTER — OFFICE VISIT (OUTPATIENT)
Dept: OBGYN | Facility: CLINIC | Age: 22
End: 2022-01-05
Payer: COMMERCIAL

## 2022-01-05 VITALS — SYSTOLIC BLOOD PRESSURE: 100 MMHG | BODY MASS INDEX: 27.01 KG/M2 | WEIGHT: 162.3 LBS | DIASTOLIC BLOOD PRESSURE: 70 MMHG

## 2022-01-05 DIAGNOSIS — Z12.4 SCREENING FOR CERVICAL CANCER: ICD-10-CM

## 2022-01-05 DIAGNOSIS — N89.8 VAGINAL ITCHING: Primary | ICD-10-CM

## 2022-01-05 LAB
CLUE CELLS: ABNORMAL
TRICHOMONAS, WET PREP: ABNORMAL
WBC'S/HIGH POWER FIELD, WET PREP: ABNORMAL
YEAST, WET PREP: ABNORMAL

## 2022-01-05 PROCEDURE — 99203 OFFICE O/P NEW LOW 30 MIN: CPT | Performed by: ADVANCED PRACTICE MIDWIFE

## 2022-01-05 PROCEDURE — 87106 FUNGI IDENTIFICATION YEAST: CPT | Performed by: ADVANCED PRACTICE MIDWIFE

## 2022-01-05 PROCEDURE — G0145 SCR C/V CYTO,THINLAYER,RESCR: HCPCS | Performed by: ADVANCED PRACTICE MIDWIFE

## 2022-01-05 PROCEDURE — 87102 FUNGUS ISOLATION CULTURE: CPT | Performed by: ADVANCED PRACTICE MIDWIFE

## 2022-01-05 PROCEDURE — 87210 SMEAR WET MOUNT SALINE/INK: CPT | Performed by: ADVANCED PRACTICE MIDWIFE

## 2022-01-05 RX ORDER — TRIAMCINOLONE ACETONIDE 1 MG/G
CREAM TOPICAL 2 TIMES DAILY
Qty: 30 G | Refills: 0 | Status: SHIPPED | OUTPATIENT
Start: 2022-01-05 | End: 2022-01-12

## 2022-01-05 NOTE — PATIENT INSTRUCTIONS
Vaginitis (Vaginal Irritation/Infection)    Vaginitis is very common!  The most common vaginal infections are bacterial vaginosis or yeast. These infections are not sexually transmitted but can be incredibly uncomfortable. Seek care from your midwife if signs or symptoms arise.     Normal vaginal discharge:      Is white, clear, thick or thin (it may change depending on where you are in your cycle)    Does not have a foul odor    The amount of discharge varies    Abnormal discharge/symptoms:       Itching in and around the vagina    Redness, pain or swelling    Discharge that is foamy, greenish, curd like, or bloody    Foul smelling odor    Pain when urinating or having sex    Fever    Causes of vaginal infections:      Good bacteria from the vagina have been destroyed by bad bacteria    Reaction to something in the vagina such as a tampon or scented/perfumed soaps or bubble bath    STI's    Sensitivities to soaps/detergents/dryer sheets, lubricants, etc.    Hormonal changes    Recent use of antibiotics     Infections can also occur after you've had intercourse with a new partner or if you have had frequent intercourse         Here is a list of suggestions that may help prevent/treat vaginal infections and will help maintain a healthy vaginal environment:      1.  Boosting your immune system so you can heal faster      Make sure you are getting adequate sleep    Drink 2-3 quarts of fluids per day, Cranberries or cranberry juice (unsweetened)    Eat more nuts, grains, raw veggies, yogurt, chencho, grapefruit    Decrease intake of refined sugar, red meat and alcohol    Echinacea - 3 times a day for chronic problem or every 2 hours for acute symptoms; use as directed on bottle          2.  Changing the vaginal environment to a more acid state       Soak in a warm bath tub with one cup of vinegar or lemon juice. Do not use scented soap, bubble bath, or oils.     Acidophilus capsules:  1 in your vagina at bedtime for 5-7  nights    Herbal sitz bath or irene-wash with:  TBSP tea tree oil or 2 TBS cider vinegar      3.  Increasing the good healthy bacteria      At each meal drink 1 tsp apple cider vinegar and 1 tsp honey in   cup warm water    Eat garlic daily, capsules or fresh.      Take probiotics 4-8 billion units/day      4.  Preventive measures      Wear cotton underwear, no thongs.  Do not wear tight clothes or pantyhose    Shower soon after working or change out of sweaty clothing     Do not wear underwear to bed.  The vaginal environment needs to breathe    Never douche or use vaginal , the vagina is self-cleaning!    Use white, unscented toilet paper.  Do not use baby wipes.  Wipe from front to back    Use only unscented tampons and pads, buy organic products if desired    Do not use perfumes/oils/lotions near your vagina or take bubble baths    Use only mild, unscented soaps around your vaginal area     Do not use fabric softeners/dryer sheets    Use gentle, unscented detergent, consider buying non-petroleum based detergents    Use only water based lubricants during sexual contact    Abstain from intercourse during times of infection    Alternative Treatment  Boric acid capsules one per vagina (not by mouth!!! Very toxic if taken orally) at bedtime for 5 days (or as suggested by your provider) may be an effective alternative treatment and also more effective for those with chronic yeast vaginitis. Boric acid is available at the pharmacy but must be purchased along with gelatin caps for insertion. It might also be available at a local compounding pharmacy. Boric acid is not safe for pregnant women. Discuss with your midwife if this treatment interests you.     If your symptoms do not resolve or if you have questions please call:     [facility]  [phone]

## 2022-01-05 NOTE — PROGRESS NOTES
"SUBJECTIVE:   Emiliana is a 21 year old here for vaginal symptoms:    Reports vaginal itching/discharge/discomfort since august 2021  States treatment has not relieved her symptoms       8/9/21 - normal wet prep, given vaginal flagyl and PO diflucan x1  11/18/21 - wet prep with clue cells, given PO flagyl and PO Diflucan     She is due for a pap, and consents to having one done today     Vaginal Symptoms     Onset: 4mo    Description:  Vaginal Discharge: changes, only consistency is there is \"a lot\" to where she needs to wear a pad. Usually white or yellow.   Itching (Pruritis): Yes external   Burning sensation:  Yes  Odor:  Yes, not fishy   Irritation:  Yes - bleeding of skin     Accompanying Signs & Symptoms:  Pain with Urination: No  Abdominal Pain:  No  Fever: No   History:   Sexually active:  No  New Partner:  No  Possibility of Pregnancy:  No  Contraceptive type: none    Precipitating factors:   Recent Antibiotic Use: Yes: vaginal flagyl in august, PO flagyl in November     Alleviating factors:  External aquaphor    Therapies Tried and outcome:   Previous Episodes of Vaginitis:  Yes:   8/9/21 - normal wet prep, given vaginal flagyl and PO diflucan x1  11/18/21 - wet prep with clue cells, given PO flagyl and PO Diflucan       Other associated symptoms: none.  History of STI's:  No  STI Testing offered: NO    LMP: Patient's last menstrual period was 01/02/2022 (exact date).      Patient Active Problem List   Diagnosis     Other idiopathic scoliosis, thoracolumbar region     Intermittent asthma, well controlled     Vitamin D deficiency     Major depression, single episode     Absence attack (H)     Exercise intolerance     Social anxiety disorder     Seasonal allergies     Past Medical History:   Diagnosis Date     Asthma, intermittent      Constipation 06/26/2011     Contact with or exposure to tuberculosis 12/2003    on INH ppd neg     Mild persistent asthma 08/20/2012     Other idiopathic scoliosis, thoracolumbar " region 2014     Undiagnosed cardiac murmurs     tiny PFO     Vitamin D deficiency 2017    vitamin D 15     History reviewed. No pertinent surgical history.  Current Outpatient Medications   Medication Sig Dispense Refill     buPROPion (WELLBUTRIN XL) 300 MG 24 hr tablet Take 1 tablet (300 mg) by mouth every morning 90 tablet 1     busPIRone (BUSPAR) 5 MG tablet Take 1 tablet (5 mg) by mouth 2 times daily 60 tablet 3     cetirizine (ZYRTEC) 10 MG tablet Take 1 tablet (10 mg) by mouth daily 90 tablet 3     spironolactone (ALDACTONE) 50 MG tablet Take 1-2 tablets ( mg) by mouth daily 60 tablet 3     Allergies   Allergen Reactions     Azithromycin Hives     Zithromax [Azithromycin Dihydrate] Hives       ROS:   CONSTITUTIONAL: NEGATIVE for fever, chills, change in weight  INTEGUMENTARU/SKIN: NEGATIVE for worrisome rashes, moles or lesions  EYES: NEGATIVE for vision changes or irritation  ENT: NEGATIVE for ear, mouth and throat problems  RESP: NEGATIVE for significant cough or SOB  BREAST: NEGATIVE for masses, tenderness or discharge  CV: NEGATIVE for chest pain, palpitations or peripheral edema  GI: NEGATIVE for nausea, abdominal pain, heartburn, or change in bowel habits  : NEGATIVE for unusual urinary, positive for vaginal itching and discharge,Periods are regular.  MUSCULOSKELETAL: NEGATIVE for significant arthralgias or myalgia  NEURO: NEGATIVE for weakness, dizziness or paresthesias  PSYCHIATRIC: NEGATIVE for changes in mood or affect    PHYSICAL EXAM:    /70 (BP Location: Left arm, Cuff Size: Adult Regular)   Wt 73.6 kg (162 lb 4.8 oz)   LMP 01/02/2022 (Exact Date)   Breastfeeding No   BMI 27.01 kg/m  , Body mass index is 27.01 kg/m .  Exam:  Constitutional: healthy, alert and no distress  Respiratory: negative  Psychiatric: mentation appears normal and affect normal/bright    Pelvic Exam:    Pelvic Exam:  Vulva: No external lesions, normal hair distribution, no adenopathy, mild excoriation noted  on labia minora  Vagina: Moist, pink, no abnormal discharge, well rugated, no lesions  Cervix:  Smooth, pink, no visible lesions, scant menstrual blood at os   Uterus: Normal size, anteverted, non-tender, mobile  Ovaries: No mass, non-tender, mobile  Rectal exam: Deferred    Results for orders placed or performed in visit on 01/05/22   Wet preparation     Status: Abnormal    Specimen: Vagina; Swab   Result Value Ref Range    Trichomonas Absent Absent    Yeast Absent Absent    Clue Cells Absent Absent    WBCs/high power field 1+ (A) None         ASSESSMENT/PLAN:   1. (N89.8) Vaginal itching  (primary encounter diagnosis)  Plan: Yeast culture, Wet preparation, triamcinolone         (KENALOG) 0.1 % external cream, conjugated         estrogens (PREMARIN) 0.625 MG/GM vaginal cream  - Wet prep wnl, yeast culture pending  - Recommended vaginal estrogen daily x 7 days and external kenalog cream BID externally x 7 days to help with symptoms of chronic irritation   - Will follow up with yeast culture results  - If no relief after 7 days, advised to follow up  - After estrogen/kenalog treatment, can use boric acid suppositories vaginally as needed for abnormal discharge/odor  - Vaginitis prevention tips reviewed at length and provided with handout     2. (Z12.4) Screening for cervical cancer  Plan: Pap imaged thin layer screen only - recommended        age 21 - 24 years        TORIN Chau, ESTEFANIA         Total time spent was 30 minutes; this includes pre-work time, intra-service time, and post-work time including time spent on documentation which occurred on the date of service.

## 2022-01-07 ENCOUNTER — TELEPHONE (OUTPATIENT)
Dept: MIDWIFE SERVICES | Facility: CLINIC | Age: 22
End: 2022-01-07
Payer: COMMERCIAL

## 2022-01-07 DIAGNOSIS — B37.31 CANDIDAL VULVOVAGINITIS: Primary | ICD-10-CM

## 2022-01-07 LAB
BKR LAB AP GYN ADEQUACY: NORMAL
BKR LAB AP GYN INTERPRETATION: NORMAL
BKR LAB AP HPV REFLEX: NO
BKR LAB AP LMP: NORMAL
BKR LAB AP PREVIOUS ABNORMAL: NORMAL
PATH REPORT.COMMENTS IMP SPEC: NORMAL
PATH REPORT.COMMENTS IMP SPEC: NORMAL
PATH REPORT.RELEVANT HX SPEC: NORMAL

## 2022-01-07 RX ORDER — TERCONAZOLE 0.4 %
1 CREAM WITH APPLICATOR VAGINAL AT BEDTIME
Qty: 1 G | Refills: 0 | Status: SHIPPED | OUTPATIENT
Start: 2022-01-07 | End: 2022-01-14

## 2022-01-07 NOTE — TELEPHONE ENCOUNTER
1/7/2022  Called pt and confirmed identity.  Reviewed results of positive candida.  Discussed vaginal hygiene, decreased sugar and carb diet while treating.  Pt not yet taking other medications.  Told her wait to take those until finishing treatment rx'd today rt new results.  RX Terazol per MAR.  Reviewed warning s/sx.  Contact us if symptoms do not improve.    TORIN Valencia CNM

## 2022-01-10 LAB — BACTERIA VAG AEROBE CULT: ABNORMAL

## 2022-01-21 ENCOUNTER — LAB (OUTPATIENT)
Dept: LAB | Facility: CLINIC | Age: 22
End: 2022-01-21
Payer: COMMERCIAL

## 2022-01-21 ENCOUNTER — OFFICE VISIT (OUTPATIENT)
Dept: DERMATOLOGY | Facility: CLINIC | Age: 22
End: 2022-01-21
Payer: COMMERCIAL

## 2022-01-21 DIAGNOSIS — L70.0 ACNE VULGARIS: Primary | ICD-10-CM

## 2022-01-21 DIAGNOSIS — Z51.81 MEDICATION MONITORING ENCOUNTER: ICD-10-CM

## 2022-01-21 DIAGNOSIS — L70.0 ACNE VULGARIS: ICD-10-CM

## 2022-01-21 LAB
ALBUMIN SERPL-MCNC: 3.7 G/DL (ref 3.4–5)
ALP SERPL-CCNC: 63 U/L (ref 40–150)
ALT SERPL W P-5'-P-CCNC: 17 U/L (ref 0–50)
ANION GAP SERPL CALCULATED.3IONS-SCNC: 4 MMOL/L (ref 3–14)
AST SERPL W P-5'-P-CCNC: 12 U/L (ref 0–45)
B-HCG SERPL-ACNC: <1 IU/L (ref 0–5)
BASOPHILS # BLD AUTO: 0 10E3/UL (ref 0–0.2)
BASOPHILS NFR BLD AUTO: 1 %
BILIRUB SERPL-MCNC: 0.4 MG/DL (ref 0.2–1.3)
BUN SERPL-MCNC: 9 MG/DL (ref 7–30)
CALCIUM SERPL-MCNC: 9.2 MG/DL (ref 8.5–10.1)
CHLORIDE BLD-SCNC: 108 MMOL/L (ref 94–109)
CHOLEST SERPL-MCNC: 196 MG/DL
CO2 SERPL-SCNC: 27 MMOL/L (ref 20–32)
CREAT SERPL-MCNC: 0.63 MG/DL (ref 0.52–1.04)
EOSINOPHIL # BLD AUTO: 0.1 10E3/UL (ref 0–0.7)
EOSINOPHIL NFR BLD AUTO: 3 %
ERYTHROCYTE [DISTWIDTH] IN BLOOD BY AUTOMATED COUNT: 13.8 % (ref 10–15)
FASTING STATUS PATIENT QL REPORTED: NORMAL
GFR SERPL CREATININE-BSD FRML MDRD: >90 ML/MIN/1.73M2
GLUCOSE BLD-MCNC: 78 MG/DL (ref 70–99)
HCG UR QL: NEGATIVE
HCT VFR BLD AUTO: 44.5 % (ref 35–47)
HDLC SERPL-MCNC: 92 MG/DL
HGB BLD-MCNC: 14.2 G/DL (ref 11.7–15.7)
IMM GRANULOCYTES # BLD: 0 10E3/UL
IMM GRANULOCYTES NFR BLD: 0 %
LDLC SERPL CALC-MCNC: 93 MG/DL
LYMPHOCYTES # BLD AUTO: 1.7 10E3/UL (ref 0.8–5.3)
LYMPHOCYTES NFR BLD AUTO: 40 %
MCH RBC QN AUTO: 28.2 PG (ref 26.5–33)
MCHC RBC AUTO-ENTMCNC: 31.9 G/DL (ref 31.5–36.5)
MCV RBC AUTO: 89 FL (ref 78–100)
MONOCYTES # BLD AUTO: 0.3 10E3/UL (ref 0–1.3)
MONOCYTES NFR BLD AUTO: 7 %
NEUTROPHILS # BLD AUTO: 2 10E3/UL (ref 1.6–8.3)
NEUTROPHILS NFR BLD AUTO: 49 %
NONHDLC SERPL-MCNC: 104 MG/DL
NRBC # BLD AUTO: 0 10E3/UL
NRBC BLD AUTO-RTO: 0 /100
PLATELET # BLD AUTO: 274 10E3/UL (ref 150–450)
POTASSIUM BLD-SCNC: 4 MMOL/L (ref 3.4–5.3)
PROT SERPL-MCNC: 7.1 G/DL (ref 6.8–8.8)
RBC # BLD AUTO: 5.03 10E6/UL (ref 3.8–5.2)
SODIUM SERPL-SCNC: 139 MMOL/L (ref 133–144)
TRIGL SERPL-MCNC: 56 MG/DL
WBC # BLD AUTO: 4.2 10E3/UL (ref 4–11)

## 2022-01-21 PROCEDURE — 36415 COLL VENOUS BLD VENIPUNCTURE: CPT | Performed by: PATHOLOGY

## 2022-01-21 PROCEDURE — 84702 CHORIONIC GONADOTROPIN TEST: CPT | Performed by: PATHOLOGY

## 2022-01-21 PROCEDURE — 80061 LIPID PANEL: CPT | Performed by: PATHOLOGY

## 2022-01-21 PROCEDURE — 80053 COMPREHEN METABOLIC PANEL: CPT | Performed by: PATHOLOGY

## 2022-01-21 PROCEDURE — 85025 COMPLETE CBC W/AUTO DIFF WBC: CPT | Performed by: PATHOLOGY

## 2022-01-21 PROCEDURE — 81025 URINE PREGNANCY TEST: CPT | Performed by: PATHOLOGY

## 2022-01-21 PROCEDURE — 99204 OFFICE O/P NEW MOD 45 MIN: CPT | Performed by: DERMATOLOGY

## 2022-01-21 RX ORDER — CLINDAMYCIN PHOSPHATE 10 UG/ML
LOTION TOPICAL DAILY
Qty: 60 ML | Refills: 11 | Status: SHIPPED | OUTPATIENT
Start: 2022-01-21 | End: 2022-09-14

## 2022-01-21 RX ORDER — TRETINOIN 0.25 MG/G
CREAM TOPICAL
Qty: 45 G | Refills: 11 | Status: SHIPPED | OUTPATIENT
Start: 2022-01-21 | End: 2022-09-14

## 2022-01-21 ASSESSMENT — PAIN SCALES - GENERAL: PAINLEVEL: NO PAIN (0)

## 2022-01-21 NOTE — LETTER
1/21/2022       RE: Emiliana Helms  [address] [address]     Dear Colleague,    Thank you for referring your patient, Emiliana Helms, to the [facility] [place] at [facility]. Please see a copy of my visit note below.    [facility] Note  Encounter Date: Jan 21, 2022  Office Visit     Dermatology Problem List:  1. Acne vulgaris, inflammatory  - Current tx: Clindamycin 1% lotion, tretinoin 0.025% cream; plan to resume isotretinoin   - Previous tx: Accutane (took for 1 month x2, stopped due to insurance issues), doxycycline, Tami  - Contraception: abstinence    ____________________________________________    Assessment & Plan:    # Acne vulgaris, moderate to severe, inflammatory.  Patient previously took isotretinoin for 1 month x2 at [facility] but had to stop due to insurance issues. She would like to resume. She has previously failed topicals and doxycycline and Tami.   Discussion of the risks and side effects of isotretinoin including but not limited to mucocutaneous dryness, arthralgias, mylalgias, depression, suicidal ideation, headache, blurred vision, GI upset, increase in liver enzyme tests, increase in lipids, and teratogenic effects. Discussed need for two forms of contraception. The iPledge program brochure was provided and the contents discussed with the patient. Ipledge program consent was obtained.  - Labs today: CBC, CMP, HCG blood, Lipids  - Plan to start isotretinoin in 1 month (40 mg daily)  - Goal dose is 8400 to 10,500 mg for 120-150 mg/kg dosing in this 70 kg patient.  - Cumulative dose: 0 mg.  - Start clindamycin 1% lotion and tretinoin 0.025% cream for 1 month until starting isotretinoin.  - Of note, patient has anxiety/depression but is well-controlled. Per prior derm notes -  A letter of clearance on file from Rachel Irby from [facility]. I am unable to  see the letter.    Procedures Performed:   None    Follow-up: 1 month(s) in-person, or earlier for new or changing lesions    Staff and Scribe:     Scribe Disclosure:  I, Richard Licona, am serving as a scribe to document services personally performed by Debby Cutler MD based on data collection and the provider's statements to me.     Provider Disclosure:   The documentation recorded by the scribe accurately reflects the services I personally performed and the decisions made by me.    Debby Cutler MD    Department of Dermatology  [facility]: Phone: [phone], Fax: [phone]  1/23/2022     ____________________________________________    CC: Accutane (not currently talking but would like to restart)    HPI:  Ms. Emiliana Helms is a(n) 21 year old female who presents today as a new patient for acne. Last seen by Christal Schmid on 4/30/2021, at which time patient was planned to restart on Accutane for treatment of acne vulgaris.    Today, patient reports that she was on Accutane, but she lost her insurance, so she has not been able to get it. One year ago, she took it for 1 month, and she noticed improvement in her acne and did not note any side effects. Prior to Accutane, she treated her acne with oral antibiotics, clindamycin, and tretinoin. She is on antidepressants, but reports everything is well-controlled.    Patient is otherwise feeling well, without additional skin concerns.    Labs Reviewed:  N/A    Physical Exam:  Vitals: LMP 01/02/2022 (Exact Date)   SKIN: Focused examination of face was performed.  - Acneiform papules and papulonodules on forehead, cheeks, and lower chin.  - No other lesions of concern on areas examined.     Medications:  Current Outpatient Medications   Medication     buPROPion (WELLBUTRIN XL) 300 MG 24 hr tablet     busPIRone (BUSPAR) 5 MG tablet     cetirizine (ZYRTEC) 10 MG  tablet     spironolactone (ALDACTONE) 50 MG tablet     No current facility-administered medications for this visit.      Past Medical History:   Patient Active Problem List   Diagnosis     Other idiopathic scoliosis, thoracolumbar region     Intermittent asthma, well controlled     Vitamin D deficiency     Major depression, single episode     Absence attack (H)     Exercise intolerance     Social anxiety disorder     Seasonal allergies     Past Medical History:   Diagnosis Date     Asthma, intermittent      Constipation 06/26/2011     Contact with or exposure to tuberculosis 12/2003    on INH ppd neg     Mild persistent asthma 08/20/2012     Other idiopathic scoliosis, thoracolumbar region 2014     Undiagnosed cardiac murmurs     tiny PFO     Vitamin D deficiency 2017    vitamin D 15        CC Referred Self, MD  No address on file on close of this encounter.

## 2022-01-21 NOTE — NURSING NOTE
Dermatology Rooming Note    Emiliana Helms's goals for this visit include:   Chief Complaint   Patient presents with     Accutane     not currently talking but would like to restart   Melisa Tafoya CMA on 1/21/2022 at 1:35 PM

## 2022-01-21 NOTE — PROGRESS NOTES
[facility] Dermatology Note  Encounter Date: Jan 21, 2022  Office Visit     Dermatology Problem List:  1. Acne vulgaris, inflammatory  - Current tx: Clindamycin 1% lotion, tretinoin 0.025% cream; plan to resume isotretinoin   - Previous tx: Accutane (took for 1 month x2, stopped due to insurance issues), doxycycline, Tami  - Contraception: abstinence    ____________________________________________    Assessment & Plan:    # Acne vulgaris, moderate to severe, inflammatory.  Patient previously took isotretinoin for 1 month x2 at [facility] but had to stop due to insurance issues. She would like to resume. She has previously failed topicals and doxycycline and Tami.   Discussion of the risks and side effects of isotretinoin including but not limited to mucocutaneous dryness, arthralgias, mylalgias, depression, suicidal ideation, headache, blurred vision, GI upset, increase in liver enzyme tests, increase in lipids, and teratogenic effects. Discussed need for two forms of contraception. The iPledge program brochure was provided and the contents discussed with the patient. Ipledge program consent was obtained.  - Labs today: CBC, CMP, HCG blood, Lipids  - Plan to start isotretinoin in 1 month (40 mg daily)  - Goal dose is 8400 to 10,500 mg for 120-150 mg/kg dosing in this 70 kg patient.  - Cumulative dose: 0 mg.  - Start clindamycin 1% lotion and tretinoin 0.025% cream for 1 month until starting isotretinoin.  - Of note, patient has anxiety/depression but is well-controlled. Per prior derm notes -  A letter of clearance on file from Rachel Irby from [facility]. I am unable to see the letter.    Procedures Performed:   None    Follow-up: 1 month(s) in-person, or earlier for new or changing lesions    Staff and Scribe:     Scribe Disclosure:  I, Richard Licona, am serving as a scribe to document services personally performed by Debby Cutler MD based on data collection and the  provider's statements to me.     Provider Disclosure:   The documentation recorded by the scribe accurately reflects the services I personally performed and the decisions made by me.    Debby Cutler MD    Department of Dermatology  [facility]: Phone: [phone], Fax: [phone]  1/23/2022     ____________________________________________    CC: Accutane (not currently talking but would like to restart)    HPI:  Ms. Emiliana Helms is a(n) 21 year old female who presents today as a new patient for acne. Last seen by Christal Schmid on 4/30/2021, at which time patient was planned to restart on Accutane for treatment of acne vulgaris.    Today, patient reports that she was on Accutane, but she lost her insurance, so she has not been able to get it. One year ago, she took it for 1 month, and she noticed improvement in her acne and did not note any side effects. Prior to Accutane, she treated her acne with oral antibiotics, clindamycin, and tretinoin. She is on antidepressants, but reports everything is well-controlled.    Patient is otherwise feeling well, without additional skin concerns.    Labs Reviewed:  N/A    Physical Exam:  Vitals: LMP 01/02/2022 (Exact Date)   SKIN: Focused examination of face was performed.  - Acneiform papules and papulonodules on forehead, cheeks, and lower chin.  - No other lesions of concern on areas examined.     Medications:  Current Outpatient Medications   Medication     buPROPion (WELLBUTRIN XL) 300 MG 24 hr tablet     busPIRone (BUSPAR) 5 MG tablet     cetirizine (ZYRTEC) 10 MG tablet     spironolactone (ALDACTONE) 50 MG tablet     No current facility-administered medications for this visit.      Past Medical History:   Patient Active Problem List   Diagnosis     Other idiopathic scoliosis, thoracolumbar region     Intermittent asthma, well controlled     Vitamin D deficiency     Major  depression, single episode     Absence attack (H)     Exercise intolerance     Social anxiety disorder     Seasonal allergies     Past Medical History:   Diagnosis Date     Asthma, intermittent      Constipation 06/26/2011     Contact with or exposure to tuberculosis 12/2003    on INH ppd neg     Mild persistent asthma 08/20/2012     Other idiopathic scoliosis, thoracolumbar region 2014     Undiagnosed cardiac murmurs     tiny PFO     Vitamin D deficiency 2017    vitamin D 15        CC Referred Self, MD  No address on file on close of this encounter.

## 2022-01-26 ENCOUNTER — TELEPHONE (OUTPATIENT)
Dept: DERMATOLOGY | Facility: CLINIC | Age: 22
End: 2022-01-26
Payer: COMMERCIAL

## 2022-01-26 NOTE — TELEPHONE ENCOUNTER
Prior Authorization Not Needed per Insurance    Medication: tretinoin (RETIN-A) 0.025 % external cream--NO PA NEEDED  Insurance Company: [employer] - Phone [phone] Fax [phone]  Expected CoPay:      Pharmacy Filling the Rx: [id number] [address]  Pharmacy Notified: Yes  Patient Notified: Yes **Instructed pharmacy to notify patient when script is ready to /ship.**

## 2022-01-26 NOTE — TELEPHONE ENCOUNTER
Prior Authorization Retail Medication Request    Medication/Dose: tretinoin (RETIN-A) 0.025 % external cream  ICD code (if different than what is on RX):  Acne vulgaris [L70.0]  Previously Tried and Failed:    Rationale:      Insurance Name:  [facility]  Insurance ID:  [id number]

## 2022-01-27 ENCOUNTER — TELEPHONE (OUTPATIENT)
Dept: OBGYN | Facility: CLINIC | Age: 22
End: 2022-01-27
Payer: COMMERCIAL

## 2022-01-27 ENCOUNTER — OFFICE VISIT (OUTPATIENT)
Dept: OPTOMETRY | Facility: CLINIC | Age: 22
End: 2022-01-27
Payer: COMMERCIAL

## 2022-01-27 DIAGNOSIS — N89.8 VAGINAL IRRITATION: Primary | ICD-10-CM

## 2022-01-27 DIAGNOSIS — H52.13 MYOPIA OF BOTH EYES: Primary | ICD-10-CM

## 2022-01-27 RX ORDER — ESTRADIOL 0.1 MG/G
2 CREAM VAGINAL DAILY
Qty: 14 G | Refills: 0 | Status: SHIPPED | OUTPATIENT
Start: 2022-01-27 | End: 2022-02-03

## 2022-01-27 ASSESSMENT — VISUAL ACUITY
OD_SC: 20/60
OS_SC: 20/60
OD_SC+: -1
METHOD: SNELLEN - LINEAR
OS_SC+: -2

## 2022-01-27 ASSESSMENT — TONOMETRY
IOP_METHOD: TONOPEN
OD_IOP_MMHG: 18
OS_IOP_MMHG: 18

## 2022-01-27 ASSESSMENT — REFRACTION_CURRENTRX
OD_BRAND: COOPER BIOFINITY BC 8.6, D 14.0
OD_CYLINDER: SPHERE
OS_BRAND: COOPER BIOFINITY BC 8.6, D 14.0
OS_SPHERE: -2.00
OS_CYLINDER: SPHERE
OD_SPHERE: -1.50

## 2022-01-27 ASSESSMENT — CUP TO DISC RATIO
OD_RATIO: 0.1
OS_RATIO: 0.1

## 2022-01-27 ASSESSMENT — EXTERNAL EXAM - LEFT EYE: OS_EXAM: NORMAL

## 2022-01-27 ASSESSMENT — REFRACTION_MANIFEST
OS_SPHERE: -2.00
OS_CYLINDER: SPHERE
OD_SPHERE: -1.50
OD_CYLINDER: SPHERE

## 2022-01-27 ASSESSMENT — CONF VISUAL FIELD
OS_NORMAL: 1
OD_NORMAL: 1

## 2022-01-27 ASSESSMENT — SLIT LAMP EXAM - LIDS
COMMENTS: NORMAL
COMMENTS: NORMAL

## 2022-01-27 ASSESSMENT — EXTERNAL EXAM - RIGHT EYE: OD_EXAM: NORMAL

## 2022-01-27 NOTE — PROGRESS NOTES
Assessment/Plan  (H52.13) Myopia of both eyes  (primary encounter diagnosis)  Comment: New CL fitting today- patient did very well at her class.   Plan: REFRACTION [[id number]], HC CONTACT LENS FITTING COSMETIC LVL 1 (30199.011)        Discussed findings with patient. New spectacle prescription dispensed to patient. Patient is welcome to return to clinic with prolonged adaptation difficulties. New CL Rx also updated after insertion and removal class. Return to clinic with new pain, redness, or prolonged lens tolerance.     Contact Lens Billing  V-Code:  - Soft spherical  Final Contact Lens Rx       Brand Sphere Cylinder    Right Ariel Biofinity BC 8.6, D 14.0 -1.50 Sphere    Left Ariel Biofinity BC 8.6, D 14.0 -2.00 Sphere    Expiration Date: 1/28/2024    Replacement: Monthly         Price per Unit: $100 fitting fee    These are for cosmetic contact lenses.    Encounter Diagnosis   Name Primary?     Myopia of both eyes Yes           40 minutes were spent on the date of the encounter doing chart review, history and exam, documentation, and further activities as noted above.    Complete documentation of historical and exam elements from today's encounter can  be found in the full encounter summary report (not reduplicated in this progress  note). I personally obtained the chief complaint(s) and history of present illness. I  confirmed and edited as necessary the review of systems, past medical/surgical  history, family history, social history, and examination findings as documented by  others; and I examined the patient myself. I personally reviewed the relevant tests,  images, and reports as documented above. I formulated and edited as necessary the  assessment and plan and discussed the findings and management plan with the  patient and family.    Ford Clemente OD

## 2022-01-27 NOTE — LETTER
1/27/2022       RE: Emiliana Helms  [address] [place] [facility] [address]     Dear Colleague,    Thank you for referring your patient, Emiliana Helms, to the EYE CLINIC at [facility]. Please see a copy of my visit note below.    Assessment/Plan  (H52.13) Myopia of both eyes  (primary encounter diagnosis)  Comment: New CL fitting today- patient did very well at her class.   Plan: REFRACTION [[id number]], HC CONTACT LENS FITTING COSMETIC LVL 1 (65571.011)        Discussed findings with patient. New spectacle prescription dispensed to patient. Patient is welcome to return to clinic with prolonged adaptation difficulties. New CL Rx also updated after insertion and removal class. Return to clinic with new pain, redness, or prolonged lens tolerance.     Contact Lens Billing  V-Code:  - Soft spherical  Final Contact Lens Rx       Brand Sphere Cylinder    Right Ariel Biofinity BC 8.6, D 14.0 -1.50 Sphere    Left Ariel Biofinity BC 8.6, D 14.0 -2.00 Sphere    Expiration Date: 1/28/2024    Replacement: Monthly         Price per Unit: $100 fitting fee    These are for cosmetic contact lenses.    Encounter Diagnosis   Name Primary?     Myopia of both eyes Yes           40 minutes were spent on the date of the encounter doing chart review, history and exam, documentation, and further activities as noted above.    Complete documentation of historical and exam elements from today's encounter can  be found in the full encounter summary report (not reduplicated in this progress  note). I personally obtained the chief complaint(s) and history of present illness. I  confirmed and edited as necessary the review of systems, past medical/surgical  history, family history, social history, and examination findings as documented by  others; and I examined the patient myself. I personally reviewed the relevant tests,  images, and reports as documented above. I formulated and edited as  necessary the  assessment and plan and discussed the findings and management plan with the  patient and family.    Ford Clemente OD       Again, thank you for allowing me to participate in the care of your patient.      Sincerely,    Ford Clemente OD

## 2022-02-08 NOTE — TELEPHONE ENCOUNTER
----- Message from Petrona Golden PA-C sent at 2/17/2021 12:24 PM CST -----  UA neg. I sent rx. Thank you for printing letter.   Statement Selected

## 2022-02-18 ENCOUNTER — OFFICE VISIT (OUTPATIENT)
Dept: DERMATOLOGY | Facility: CLINIC | Age: 22
End: 2022-02-18
Payer: COMMERCIAL

## 2022-02-18 ENCOUNTER — LAB (OUTPATIENT)
Dept: LAB | Facility: CLINIC | Age: 22
End: 2022-02-18
Payer: COMMERCIAL

## 2022-02-18 DIAGNOSIS — Z51.81 MEDICATION MONITORING ENCOUNTER: ICD-10-CM

## 2022-02-18 DIAGNOSIS — L70.0 ACNE VULGARIS: ICD-10-CM

## 2022-02-18 DIAGNOSIS — L70.0 ACNE VULGARIS: Primary | ICD-10-CM

## 2022-02-18 LAB
ALBUMIN SERPL-MCNC: 3.6 G/DL (ref 3.4–5)
ALP SERPL-CCNC: 69 U/L (ref 40–150)
ALT SERPL W P-5'-P-CCNC: 17 U/L (ref 0–50)
ANION GAP SERPL CALCULATED.3IONS-SCNC: 7 MMOL/L (ref 3–14)
AST SERPL W P-5'-P-CCNC: 12 U/L (ref 0–45)
B-HCG SERPL-ACNC: <1 IU/L (ref 0–5)
BASOPHILS # BLD AUTO: 0 10E3/UL (ref 0–0.2)
BASOPHILS NFR BLD AUTO: 1 %
BILIRUB SERPL-MCNC: 0.3 MG/DL (ref 0.2–1.3)
BUN SERPL-MCNC: 10 MG/DL (ref 7–30)
CALCIUM SERPL-MCNC: 8.7 MG/DL (ref 8.5–10.1)
CHLORIDE BLD-SCNC: 107 MMOL/L (ref 94–109)
CHOLEST SERPL-MCNC: 180 MG/DL
CO2 SERPL-SCNC: 25 MMOL/L (ref 20–32)
CREAT SERPL-MCNC: 0.62 MG/DL (ref 0.52–1.04)
EOSINOPHIL # BLD AUTO: 0.1 10E3/UL (ref 0–0.7)
EOSINOPHIL NFR BLD AUTO: 3 %
ERYTHROCYTE [DISTWIDTH] IN BLOOD BY AUTOMATED COUNT: 12.5 % (ref 10–15)
FASTING STATUS PATIENT QL REPORTED: NO
GFR SERPL CREATININE-BSD FRML MDRD: >90 ML/MIN/1.73M2
GLUCOSE BLD-MCNC: 73 MG/DL (ref 70–99)
HCG UR QL: NEGATIVE
HCT VFR BLD AUTO: 42.4 % (ref 35–47)
HDLC SERPL-MCNC: 90 MG/DL
HGB BLD-MCNC: 13.8 G/DL (ref 11.7–15.7)
IMM GRANULOCYTES # BLD: 0 10E3/UL
IMM GRANULOCYTES NFR BLD: 0 %
LDLC SERPL CALC-MCNC: 82 MG/DL
LYMPHOCYTES # BLD AUTO: 1.3 10E3/UL (ref 0.8–5.3)
LYMPHOCYTES NFR BLD AUTO: 36 %
MCH RBC QN AUTO: 29.1 PG (ref 26.5–33)
MCHC RBC AUTO-ENTMCNC: 32.5 G/DL (ref 31.5–36.5)
MCV RBC AUTO: 90 FL (ref 78–100)
MONOCYTES # BLD AUTO: 0.3 10E3/UL (ref 0–1.3)
MONOCYTES NFR BLD AUTO: 8 %
NEUTROPHILS # BLD AUTO: 1.9 10E3/UL (ref 1.6–8.3)
NEUTROPHILS NFR BLD AUTO: 52 %
NONHDLC SERPL-MCNC: 90 MG/DL
NRBC # BLD AUTO: 0 10E3/UL
NRBC BLD AUTO-RTO: 0 /100
PLATELET # BLD AUTO: 237 10E3/UL (ref 150–450)
POTASSIUM BLD-SCNC: 3.6 MMOL/L (ref 3.4–5.3)
PROT SERPL-MCNC: 6.9 G/DL (ref 6.8–8.8)
RBC # BLD AUTO: 4.74 10E6/UL (ref 3.8–5.2)
SODIUM SERPL-SCNC: 139 MMOL/L (ref 133–144)
TRIGL SERPL-MCNC: 42 MG/DL
WBC # BLD AUTO: 3.6 10E3/UL (ref 4–11)

## 2022-02-18 PROCEDURE — 80061 LIPID PANEL: CPT | Performed by: PATHOLOGY

## 2022-02-18 PROCEDURE — 81025 URINE PREGNANCY TEST: CPT | Performed by: PATHOLOGY

## 2022-02-18 PROCEDURE — 80053 COMPREHEN METABOLIC PANEL: CPT | Performed by: PATHOLOGY

## 2022-02-18 PROCEDURE — 99214 OFFICE O/P EST MOD 30 MIN: CPT | Performed by: DERMATOLOGY

## 2022-02-18 PROCEDURE — 36415 COLL VENOUS BLD VENIPUNCTURE: CPT | Performed by: PATHOLOGY

## 2022-02-18 PROCEDURE — 85025 COMPLETE CBC W/AUTO DIFF WBC: CPT | Performed by: PATHOLOGY

## 2022-02-18 PROCEDURE — 84702 CHORIONIC GONADOTROPIN TEST: CPT | Performed by: PATHOLOGY

## 2022-02-18 RX ORDER — ISOTRETINOIN 40 MG/1
40 CAPSULE ORAL DAILY
Qty: 30 CAPSULE | Refills: 0 | Status: SHIPPED | OUTPATIENT
Start: 2022-02-18 | End: 2022-03-29

## 2022-02-18 ASSESSMENT — PAIN SCALES - GENERAL: PAINLEVEL: NO PAIN (0)

## 2022-02-18 NOTE — NURSING NOTE
Dermatology Rooming Note    Emiliana Helms's goals for this visit include:   Chief Complaint   Patient presents with     Acne     follow up, states that she is still having some bumps but it has gotten better     Melisa Tafoya CMA on 2/18/2022 at 9:34 AM

## 2022-02-18 NOTE — PROGRESS NOTES
[facility] Dermatology Note  Encounter Date: Feb 18, 2022  Office Visit     Dermatology Problem List:  1. Acne vulgaris, inflammatory  - Current tx: Clindamycin 1% lotion, tretinoin 0.025% cream; plan to resume isotretinoin   - Previous tx: Accutane (took for 1 month x2, stopped due to insurance issues), doxycycline, Tami  - Contraception: abstinence  ____________________________________________    Assessment & Plan:    # Acne vulgaris, moderate to severe, inflammatory.  Patient previously took isotretinoin for 1 month x2 at [facility] but had to stop due to insurance issues. She would like to resume. She has previously failed topicals and doxycycline and Tami.   Reviewed the risks and side effects of isotretinoin including but not limited to mucocutaneous dryness, arthralgias, myalgias, depression, suicidal ideation, headache, blurred vision, GI upset, increase in liver enzyme tests, increase in lipids, and teratogenic effects. Discussed need for two forms of contraception. Patient is established in the iPledge program.  - iPledge ID #[id number]  - Discontinue clindamycin and tretinoin, as well as all other skin products aside from gentle cleanser/moisturizer.  - Labs ordered today: HCG Qualitative (blood)  - Start isotretinoin 40 mg daily  - Goal dose is 8400 to 10,500 mg for 120-150 mg/kg dosing in this 70 kg patient.  - Cumulative dose: 0 mg.  - Of note, patient has anxiety/depression but is well-controlled. Per prior derm notes, there is a letter of clearance on file from Rachel Irby from [facility]. I am unable to see the letter.    Procedures Performed:   None      Follow-up: 1 month(s) or earlier for new or changing lesions    Staff and Scribe:     Provider Disclosure:   The documentation recorded by the scribe accurately reflects the services I personally performed and the decisions made by me.    Debby Cutler MD    Department of  Dermatology  [facility]: Phone: [phone], Fax: [phone]  2/20/2022       Scribe Disclosure:  I, Michelle West, am serving as a scribe to document services personally performed by Debby Culter MD based on data collection and the provider's statements to me.   ____________________________________________    CC: Acne (follow up, states that she is still having some bumps but it has gotten better)    HPI:  Ms. Emiliana Helms is a(n) 21 year old female who presents today as a return patient for accutane. The patient was last seen in dermatology on 1/21/2022 by myself, at which time she was advised to start isotretinoin 40 mg daily pending 2 negative pregnancy tests and labs. Patient was also advised to start clindamycin 1% lotion and tretinoin 0.025% cream daily for 1 month whilst waiting to start isotretinoin.    Today, the patient states that she has no new concerns and would still like to start accutane. Patient is otherwise feeling well, without additional skin concerns.    Labs Reviewed:  N/A    Physical Exam:  Vitals: There were no vitals taken for this visit.  SKIN: Exam deferred today.    Medications:  Current Outpatient Medications   Medication     buPROPion (WELLBUTRIN XL) 300 MG 24 hr tablet     busPIRone (BUSPAR) 5 MG tablet     cetirizine (ZYRTEC) 10 MG tablet     clindamycin (CLEOCIN T) 1 % external lotion     spironolactone (ALDACTONE) 50 MG tablet     tretinoin (RETIN-A) 0.025 % external cream     No current facility-administered medications for this visit.      Past Medical History:   Patient Active Problem List   Diagnosis     Other idiopathic scoliosis, thoracolumbar region     Intermittent asthma, well controlled     Vitamin D deficiency     Major depression, single episode     Absence attack (H)     Exercise intolerance     Social anxiety disorder     Seasonal allergies     Past Medical History:   Diagnosis Date      Asthma, intermittent      Constipation 06/26/2011     Contact with or exposure to tuberculosis 12/2003    on INH ppd neg     Mild persistent asthma 08/20/2012     Other idiopathic scoliosis, thoracolumbar region 2014     Undiagnosed cardiac murmurs     tiny PFO     Vitamin D deficiency 2017    vitamin D 15

## 2022-02-18 NOTE — LETTER
2/18/2022       RE: Emiliana Helms  [address] [facility] [address]     Dear Colleague,    Thank you for referring your patient, Emiliana Helms, to the [facility] [place] [facility]. Please see a copy of my visit note below.    [facility] Dermatology Note  Encounter Date: Feb 18, 2022  Office Visit     Dermatology Problem List:  1. Acne vulgaris, inflammatory  - Current tx: Clindamycin 1% lotion, tretinoin 0.025% cream; plan to resume isotretinoin   - Previous tx: Accutane (took for 1 month x2, stopped due to insurance issues), doxycycline, Tami  - Contraception: abstinence  ____________________________________________    Assessment & Plan:    # Acne vulgaris, moderate to severe, inflammatory.  Patient previously took isotretinoin for 1 month x2 at [facility] but had to stop due to insurance issues. She would like to resume. She has previously failed topicals and doxycycline and Tami.   Reviewed the risks and side effects of isotretinoin including but not limited to mucocutaneous dryness, arthralgias, myalgias, depression, suicidal ideation, headache, blurred vision, GI upset, increase in liver enzyme tests, increase in lipids, and teratogenic effects. Discussed need for two forms of contraception. Patient is established in the iPledge program.  - iPledge ID #[id number]  - Discontinue clindamycin and tretinoin, as well as all other skin products aside from gentle cleanser/moisturizer.  - Labs ordered today: HCG Qualitative (blood)  - Start isotretinoin 40 mg daily  - Goal dose is 8400 to 10,500 mg for 120-150 mg/kg dosing in this 70 kg patient.  - Cumulative dose: 0 mg.  - Of note, patient has anxiety/depression but is well-controlled. Per prior derm notes, there is a letter of clearance on file from Rachel Irby from [facility]. I am unable to see the letter.    Procedures Performed:    None      Follow-up: 1 month(s) or earlier for new or changing lesions    Staff and Scribe:     Provider Disclosure:   The documentation recorded by the scribe accurately reflects the services I personally performed and the decisions made by me.    Debby Cutler MD    Department of Dermatology  [facility]: Phone: [phone], Fax: [phone]  2/20/2022       Scribe Disclosure:  I, Michelle West, am serving as a scribe to document services personally performed by Debby Cutler MD based on data collection and the provider's statements to me.   ____________________________________________    CC: Acne (follow up, states that she is still having some bumps but it has gotten better)    HPI:  Ms. Emiliana Helms is a(n) 21 year old female who presents today as a return patient for accutane. The patient was last seen in dermatology on 1/21/2022 by myself, at which time she was advised to start isotretinoin 40 mg daily pending 2 negative pregnancy tests and labs. Patient was also advised to start clindamycin 1% lotion and tretinoin 0.025% cream daily for 1 month whilst waiting to start isotretinoin.    Today, the patient states that she has no new concerns and would still like to start accutane. Patient is otherwise feeling well, without additional skin concerns.    Labs Reviewed:  N/A    Physical Exam:  Vitals: There were no vitals taken for this visit.  SKIN: Exam deferred today.    Medications:  Current Outpatient Medications   Medication     buPROPion (WELLBUTRIN XL) 300 MG 24 hr tablet     busPIRone (BUSPAR) 5 MG tablet     cetirizine (ZYRTEC) 10 MG tablet     clindamycin (CLEOCIN T) 1 % external lotion     spironolactone (ALDACTONE) 50 MG tablet     tretinoin (RETIN-A) 0.025 % external cream     No current facility-administered medications for this visit.      Past Medical History:   Patient Active Problem List    Diagnosis     Other idiopathic scoliosis, thoracolumbar region     Intermittent asthma, well controlled     Vitamin D deficiency     Major depression, single episode     Absence attack (H)     Exercise intolerance     Social anxiety disorder     Seasonal allergies     Past Medical History:   Diagnosis Date     Asthma, intermittent      Constipation 06/26/2011     Contact with or exposure to tuberculosis 12/2003    on INH ppd neg     Mild persistent asthma 08/20/2012     Other idiopathic scoliosis, thoracolumbar region 2014     Undiagnosed cardiac murmurs     tiny PFO     Vitamin D deficiency 2017    vitamin D 15

## 2022-02-23 ENCOUNTER — TELEPHONE (OUTPATIENT)
Dept: DERMATOLOGY | Facility: CLINIC | Age: 22
End: 2022-02-23
Payer: COMMERCIAL

## 2022-02-23 NOTE — TELEPHONE ENCOUNTER
Left message informing patient that a verification code has been sent to her email to get her set up in [employer] under Dr. Cutler. Call back number given for her to call back and let us know of that code/ if she has questions.    Melisa Tafoya CMA on 2/23/2022 at 10:44 AM

## 2022-02-24 NOTE — TELEPHONE ENCOUNTER
Called patient again.    Completed ipledge registration under Dr. Cutler. Patient is now in the 30 day wait period as of 2/24/22. Another pregnancy test is needed on 3/24/22.    Patient informed. Nothing else needed at this time.    Accutane Intake:  Ipledge number:[id number]  Copy of consent retained for medical record:Yes  Baseline and future lab orders placed:Yes  Baseline weight obtained:Yes  Patient registered:Yes     Melisa Tafoya CMA on 2/24/2022 at 10:37 AM

## 2022-03-28 NOTE — PROGRESS NOTES
[facility] Dermatology Clinic Note - EP    Encounter Date: Mar 29, 2022    Dermatology Problem List:  1. Acne vulgaris, inflammatory  - Current tx:  isotretinoin 40 mg  - Previous tx: Accutane (took for 1 month x2, stopped due to insurance issues), doxycycline, Tami, Clindamycin 1% lotion, tretinoin 0.025% cream  - Contraception: abstinence   ____________________________________________    Assessment & Plan:     # Acne vulgaris, moderate to severe, inflammatory.  Patient previously took isotretinoin for 1 month x2 at [facility] but had to stop due to insurance issues. She would like to resume. She has previously failed topicals and doxycycline and Tami.   Reviewed the risks and side effects of isotretinoin including but not limited to mucocutaneous dryness, arthralgias, myalgias, depression, suicidal ideation, headache, blurred vision, GI upset, increase in liver enzyme tests, increase in lipids, and teratogenic effects. Discussed need for two forms of contraception. Patient is established in the iPledge program.  - iPledge ID #[id number]  - Discontinue clindamycin and tretinoin, as well as all other skin products aside from gentle cleanser/moisturizer.  - HCG qualitative and quantitative on 2/18/22 negative. HCG qualitative 3/29/22 (today) negative.   - Labs today: HCG Qualitative (blood)  - Start isotretinoin 40 mg daily. Plan to increase to 80 mg after one month.   - Plan to recheck liver enzymes and lipids after second month on the medication.   - Goal dose is 8400 to 10,500 mg for 120-150 mg/kg dosing in this 70 kg patient.  - Cumulative dose: 0 mg.  - Of note, patient has anxiety/depression but is well-controlled. Per prior derm notes, there is a letter of clearance on file from Rachel Irby from [facility]. I am unable to see the letter.    Procedures Performed:   None    Follow-up: 1 month    Benito Miner MD  Dermatology/Dermatopathology Staff Physician  , Department of  Dermatology    ____________________________________________    CC: Accutane (acutane start)      HPI:  Ms. Emiliana Helms is a(n) extremely pleasant 21 year old female who presents today as a return patient for acne. Last seen in dermatology on 2/18/22 by Dr. Cutler at which point she was started on isotretinoin 40 mg daily for treatment of acne.     Today, the patient states that she started Accutane for one month previously but had to discontinue as she lost insurance. She would now like to restart. The patient denies any painful, bleeding, or nonhealing lesions, or any new or changing moles. Patient is otherwise feeling well, without additional skin concerns.     Labs Reviewed:  - HCG qualitative and quantitative from 2/18/22 and 3/29/22.     Physical Exam:  Vitals: /70   Wt 73.5 kg (162 lb)   BMI 26.96 kg/m    SKIN: Focused examination of the face was performed.  - There are superifical acneiform papules with intermixed open and closed comedones on the face.   - No other lesions of concern on areas examined.     Medications:  Current Outpatient Medications   Medication     buPROPion (WELLBUTRIN XL) 300 MG 24 hr tablet     busPIRone (BUSPAR) 5 MG tablet     cetirizine (ZYRTEC) 10 MG tablet     clindamycin (CLEOCIN T) 1 % external lotion     spironolactone (ALDACTONE) 50 MG tablet     ISOtretinoin (ACCUTANE) 40 MG capsule     tretinoin (RETIN-A) 0.025 % external cream     No current facility-administered medications for this visit.      Past Medical History:   Patient Active Problem List   Diagnosis     Other idiopathic scoliosis, thoracolumbar region     Intermittent asthma, well controlled     Vitamin D deficiency     Major depression, single episode     Absence attack (H)     Exercise intolerance     Social anxiety disorder     Seasonal allergies     Past Medical History:   Diagnosis Date     Asthma, intermittent      Constipation 06/26/2011     Contact with or exposure to tuberculosis 12/2003    on INH  ppd neg     Mild persistent asthma 08/20/2012     Other idiopathic scoliosis, thoracolumbar region 2014     Undiagnosed cardiac murmurs     tiny PFO     Vitamin D deficiency 2017    vitamin D 15       CC Paris Alexandra MD  [address] [place] [address] on close of this encounter.    This note has been created using voice recognition software; while it has been reviewed, some errors may persist.

## 2022-03-29 ENCOUNTER — OFFICE VISIT (OUTPATIENT)
Dept: FAMILY MEDICINE | Facility: CLINIC | Age: 22
End: 2022-03-29
Payer: COMMERCIAL

## 2022-03-29 VITALS — DIASTOLIC BLOOD PRESSURE: 70 MMHG | BODY MASS INDEX: 26.96 KG/M2 | SYSTOLIC BLOOD PRESSURE: 122 MMHG | WEIGHT: 162 LBS

## 2022-03-29 DIAGNOSIS — Z51.81 MEDICATION MONITORING ENCOUNTER: ICD-10-CM

## 2022-03-29 DIAGNOSIS — L70.0 ACNE VULGARIS: Primary | ICD-10-CM

## 2022-03-29 LAB — HCG UR QL: NEGATIVE

## 2022-03-29 PROCEDURE — 99214 OFFICE O/P EST MOD 30 MIN: CPT | Performed by: DERMATOLOGY

## 2022-03-29 PROCEDURE — 81025 URINE PREGNANCY TEST: CPT | Performed by: DERMATOLOGY

## 2022-03-29 RX ORDER — ISOTRETINOIN 40 MG/1
40 CAPSULE ORAL DAILY
Qty: 30 CAPSULE | Refills: 0 | Status: SHIPPED | OUTPATIENT
Start: 2022-03-29 | End: 2022-04-27

## 2022-03-29 NOTE — LETTER
3/29/2022         RE: Emiliana Helms  [address] [facility] [address]        Dear Colleague,    Thank you for referring your patient, Emiliana Helms, to the [facility]. Please see a copy of my visit note below.    [facility] Dermatology Clinic Note - EP    Encounter Date: Mar 29, 2022    Dermatology Problem List:  1. Acne vulgaris, inflammatory  - Current tx:  isotretinoin 40 mg  - Previous tx: Accutane (took for 1 month x2, stopped due to insurance issues), doxycycline, Tami, Clindamycin 1% lotion, tretinoin 0.025% cream  - Contraception: abstinence   ____________________________________________    Assessment & Plan:     # Acne vulgaris, moderate to severe, inflammatory.  Patient previously took isotretinoin for 1 month x2 at [facility] but had to stop due to insurance issues. She would like to resume. She has previously failed topicals and doxycycline and Tami.   Reviewed the risks and side effects of isotretinoin including but not limited to mucocutaneous dryness, arthralgias, myalgias, depression, suicidal ideation, headache, blurred vision, GI upset, increase in liver enzyme tests, increase in lipids, and teratogenic effects. Discussed need for two forms of contraception. Patient is established in the iPledge program.  - iPledge ID #[id number]  - Discontinue clindamycin and tretinoin, as well as all other skin products aside from gentle cleanser/moisturizer.  - HCG qualitative and quantitative on 2/18/22 negative. HCG qualitative 3/29/22 (today) negative.   - Labs today: HCG Qualitative (blood)  - Start isotretinoin 40 mg daily. Plan to increase to 80 mg after one month.   - Plan to recheck liver enzymes and lipids after second month on the medication.   - Goal dose is 8400 to 10,500 mg for 120-150 mg/kg dosing in this 70 kg patient.  - Cumulative dose: 0 mg.  - Of note, patient has anxiety/depression but is well-controlled. Per prior derm notes, there is a letter of  clearance on file from Rachel Irby from [facility]. I am unable to see the letter.    Procedures Performed:   None    Follow-up: 1 month    Benito Miner MD  Dermatology/Dermatopathology Staff Physician  , Department of Dermatology    ____________________________________________    CC: Accutane (acutane start)      HPI:  Ms. Emiliana Helms is a(n) extremely pleasant 21 year old female who presents today as a return patient for acne. Last seen in dermatology on 2/18/22 by Dr. Cutler at which point she was started on isotretinoin 40 mg daily for treatment of acne.     Today, the patient states that she started Accutane for one month previously but had to discontinue as she lost insurance. She would now like to restart. The patient denies any painful, bleeding, or nonhealing lesions, or any new or changing moles. Patient is otherwise feeling well, without additional skin concerns.     Labs Reviewed:  - HCG qualitative and quantitative from 2/18/22 and 3/29/22.     Physical Exam:  Vitals: /70   Wt 73.5 kg (162 lb)   BMI 26.96 kg/m    SKIN: Focused examination of the face was performed.  - There are superifical acneiform papules with intermixed open and closed comedones on the face.   - No other lesions of concern on areas examined.     Medications:  Current Outpatient Medications   Medication     buPROPion (WELLBUTRIN XL) 300 MG 24 hr tablet     busPIRone (BUSPAR) 5 MG tablet     cetirizine (ZYRTEC) 10 MG tablet     clindamycin (CLEOCIN T) 1 % external lotion     spironolactone (ALDACTONE) 50 MG tablet     ISOtretinoin (ACCUTANE) 40 MG capsule     tretinoin (RETIN-A) 0.025 % external cream     No current facility-administered medications for this visit.      Past Medical History:   Patient Active Problem List   Diagnosis     Other idiopathic scoliosis, thoracolumbar region     Intermittent asthma, well controlled     Vitamin D deficiency     Major depression, single episode      Absence attack (H)     Exercise intolerance     Social anxiety disorder     Seasonal allergies     Past Medical History:   Diagnosis Date     Asthma, intermittent      Constipation 06/26/2011     Contact with or exposure to tuberculosis 12/2003    on INH ppd neg     Mild persistent asthma 08/20/2012     Other idiopathic scoliosis, thoracolumbar region 2014     Undiagnosed cardiac murmurs     tiny PFO     Vitamin D deficiency 2017    vitamin D 15       CC Paris Alexandra MD  [address] [place] [address] on close of this encounter.    This note has been created using voice recognition software; while it has been reviewed, some errors may persist.         Again, thank you for allowing me to participate in the care of your patient.        Sincerely,        Benito Miner MD

## 2022-04-05 ENCOUNTER — VIRTUAL VISIT (OUTPATIENT)
Dept: INTERNAL MEDICINE | Facility: CLINIC | Age: 22
End: 2022-04-05
Payer: COMMERCIAL

## 2022-04-05 DIAGNOSIS — F32.0 CURRENT MILD EPISODE OF MAJOR DEPRESSIVE DISORDER WITHOUT PRIOR EPISODE (H): Primary | ICD-10-CM

## 2022-04-05 PROCEDURE — 96127 BRIEF EMOTIONAL/BEHAV ASSMT: CPT | Performed by: PHYSICIAN ASSISTANT

## 2022-04-05 PROCEDURE — 99213 OFFICE O/P EST LOW 20 MIN: CPT | Mod: 95 | Performed by: PHYSICIAN ASSISTANT

## 2022-04-05 RX ORDER — BUPROPION HYDROCHLORIDE 150 MG/1
300 TABLET ORAL DAILY
COMMUNITY
Start: 2022-03-13 | End: 2022-04-05

## 2022-04-05 RX ORDER — BUPROPION HYDROCHLORIDE 150 MG/1
300 TABLET ORAL DAILY
Qty: 60 TABLET | Refills: 1 | Status: SHIPPED | OUTPATIENT
Start: 2022-04-05

## 2022-04-05 ASSESSMENT — ANXIETY QUESTIONNAIRES
3. WORRYING TOO MUCH ABOUT DIFFERENT THINGS: SEVERAL DAYS
IF YOU CHECKED OFF ANY PROBLEMS ON THIS QUESTIONNAIRE, HOW DIFFICULT HAVE THESE PROBLEMS MADE IT FOR YOU TO DO YOUR WORK, TAKE CARE OF THINGS AT HOME, OR GET ALONG WITH OTHER PEOPLE: SOMEWHAT DIFFICULT
GAD7 TOTAL SCORE: 8
5. BEING SO RESTLESS THAT IT IS HARD TO SIT STILL: NOT AT ALL
2. NOT BEING ABLE TO STOP OR CONTROL WORRYING: MORE THAN HALF THE DAYS
6. BECOMING EASILY ANNOYED OR IRRITABLE: NOT AT ALL
7. FEELING AFRAID AS IF SOMETHING AWFUL MIGHT HAPPEN: NOT AT ALL
1. FEELING NERVOUS, ANXIOUS, OR ON EDGE: MORE THAN HALF THE DAYS

## 2022-04-05 ASSESSMENT — PATIENT HEALTH QUESTIONNAIRE - PHQ9
SUM OF ALL RESPONSES TO PHQ QUESTIONS 1-9: 17
5. POOR APPETITE OR OVEREATING: NEARLY EVERY DAY

## 2022-04-05 NOTE — PROGRESS NOTES
"Emiliana is a 21 year old who is being evaluated via a billable telephone visit.      What phone number would you like to be contacted at? [phone]  How would you like to obtain your AVS? Mail a copy    Assessment & Plan     Current mild episode of major depressive disorder without prior episode (H)  Reviewed medication with patient today.    - buPROPion (WELLBUTRIN XL) 150 MG 24 hr tablet; Take 2 tablets (300 mg) by mouth daily             BMI:   Estimated body mass index is 26.96 kg/m  as calculated from the following:    Height as of 11/18/21: 1.651 m (5' 5\").    Weight as of 3/29/22: 73.5 kg (162 lb).   Weight management plan: Patient was referred to their PCP to discuss a diet and exercise plan.    Depression Screening Follow Up    PHQ 4/5/2022   PHQ-9 Total Score 17   Q9: Thoughts of better off dead/self-harm past 2 weeks Several days                 Follow Up      Follow Up Actions Taken  Crisis resource information provided in the After Visit Summary  Patient to follow up with PCP.  Clinic staff to schedule appointment if able.    Discussed the following ways the patient can remain in a safe environment:  be around others      Return in about 6 weeks (around 5/17/2022) for regular primary provider f/u depression .    Lisa Ponce PA-C  [facility]    Subjective   Emiliana is a 21 year old who presents for the following health issues     HPI     Depression and Anxiety Follow-Up    How are you doing with your depression since your last visit? Improved Some    How are you doing with your anxiety since your last visit?  Improved some    Are you having other symptoms that might be associated with depression or anxiety? No    Have you had a significant life event? No     Do you have any concerns with your use of alcohol or other drugs? No    Social History     Tobacco Use     Smoking status: Never Smoker     Smokeless tobacco: Never Used     Tobacco comment: non smoking home "   Vaping Use     Vaping Use: Never used   Substance Use Topics     Alcohol use: No     Alcohol/week: 0.0 standard drinks     Comment: 9/2/16LW     Drug use: No     Comment: 9/2/16LW     PHQ 7/12/2021 11/18/2021 4/5/2022   PHQ-9 Total Score 20 2 17   Q9: Thoughts of better off dead/self-harm past 2 weeks Not at all Not at all Several days     TERESA-7 SCORE 7/12/2021 11/18/2021 4/5/2022   Total Score 21 8 8     Last PHQ-9 4/5/2022   1.  Little interest or pleasure in doing things 2   2.  Feeling down, depressed, or hopeless 2   3.  Trouble falling or staying asleep, or sleeping too much 1   4.  Feeling tired or having little energy 1   5.  Poor appetite or overeating 3   6.  Feeling bad about yourself 3   7.  Trouble concentrating 3   8.  Moving slowly or restless 1   Q9: Thoughts of better off dead/self-harm past 2 weeks 1   PHQ-9 Total Score 17   Difficulty at work, home, or with people Somewhat difficult     TERESA-7  4/5/2022   1. Feeling nervous, anxious, or on edge 2   2. Not being able to stop or control worrying 2   3. Worrying too much about different things 1   4. Trouble relaxing 3   5. Being so restless that it is hard to sit still 0   6. Becoming easily annoyed or irritable 0   7. Feeling afraid, as if something awful might happen 0   TERESA-7 Total Score 8   If you checked any problems, how difficult have they made it for you to do your work, take care of things at home, or get along with other people? Somewhat difficult     Issues with the 300 mg tab of Wellbutrin - states on the bottle did not say 24 hour type of medication and so found that it was not working well for her.  She had some 150 mg tab of Wellbutrin XL ( 24 hour) at home and so started taking two recently when she ran out of the 300 mg tabs.   Finds that the 2  X 150 mg tab of Wellbutrin XL is working better for her and she feels that mood is improving  Has not been using buspar.          Suicide Assessment Five-step Evaluation and Treatment  (SAFE-T)          Review of Systems   Constitutional, HEENT, cardiovascular, pulmonary, gi and gu systems are negative, except as otherwise noted.      Objective           Vitals:  No vitals were obtained today due to virtual visit.    Physical Exam   healthy, alert and no distress  PSYCH: Alert and oriented times 3; coherent speech, normal   rate and volume, able to articulate logical thoughts, able   to abstract reason, no tangential thoughts, no hallucinations   or delusions  Her affect is normal  RESP: No cough, no audible wheezing, able to talk in full sentences  Remainder of exam unable to be completed due to telephone visits                Phone call duration: 10 minutes

## 2022-04-06 ASSESSMENT — ANXIETY QUESTIONNAIRES: GAD7 TOTAL SCORE: 8

## 2022-04-07 ENCOUNTER — TELEPHONE (OUTPATIENT)
Dept: PEDIATRICS | Facility: CLINIC | Age: 22
End: 2022-04-07
Payer: COMMERCIAL

## 2022-04-07 NOTE — TELEPHONE ENCOUNTER
Prior Authorization Retail Medication Request    Medication/Dose: Bupropion HCL ER (XL) 150 MG ER Tablets  ICD code (if different than what is on RX): [F32.0]    Insurance Name:  [employer] [facility]    Insurance ID:   [id number]    Pharmacy Information (if different than what is on RX)  Name:[facility]  Phone:[phone]

## 2022-04-11 NOTE — TELEPHONE ENCOUNTER
Central Prior Authorization Team   Phone: [phone]      PA Initiation    Medication: Bupropion HCL ER (XL) 150 MG ER Tablets-Initited  Insurance Company: [employer] - Phone [phone] Fax [phone]  Pharmacy Filling the Rx: [id number] [address]  Filling Pharmacy Phone: [phone]  Filling Pharmacy Fax:    Start Date: 4/11/2022

## 2022-04-12 NOTE — TELEPHONE ENCOUNTER
PRIOR AUTHORIZATION DENIED    Medication: Bupropion HCL ER (XL) 150 MG ER Tablets-DENIED    Denial Date: 4/11/2022    Denial Rational:       Appeal Information:

## 2022-04-12 NOTE — TELEPHONE ENCOUNTER
Please let patient know that her insurance does not want to cover taking Wellbutrin 150 mg tablets - taking 2 tablets daily.   She had wanted to take two of the 150 mg tablets instead of one tablet of the 300 mg dosing.   Recommend to try the 300 mg dose that she should have a prescription of at home.   Her insurance just will not cover more than 30 tabs of the 150 mg dosing.

## 2022-04-12 NOTE — TELEPHONE ENCOUNTER
Routing to new PCP    Electronically signed by:  Paris Alexandra MD  Pediatrics  [facility]

## 2022-04-27 ENCOUNTER — OFFICE VISIT (OUTPATIENT)
Dept: DERMATOLOGY | Facility: CLINIC | Age: 22
End: 2022-04-27
Payer: COMMERCIAL

## 2022-04-27 ENCOUNTER — LAB (OUTPATIENT)
Dept: LAB | Facility: CLINIC | Age: 22
End: 2022-04-27
Payer: COMMERCIAL

## 2022-04-27 DIAGNOSIS — L70.0 ACNE VULGARIS: ICD-10-CM

## 2022-04-27 DIAGNOSIS — Z51.81 MEDICATION MONITORING ENCOUNTER: ICD-10-CM

## 2022-04-27 DIAGNOSIS — D70.9 NEUTROPENIA, UNSPECIFIED TYPE (H): Primary | ICD-10-CM

## 2022-04-27 LAB
ALBUMIN SERPL-MCNC: 3.7 G/DL (ref 3.4–5)
ALP SERPL-CCNC: 62 U/L (ref 40–150)
ALT SERPL W P-5'-P-CCNC: 19 U/L (ref 0–50)
ANION GAP SERPL CALCULATED.3IONS-SCNC: 6 MMOL/L (ref 3–14)
AST SERPL W P-5'-P-CCNC: 15 U/L (ref 0–45)
B-HCG SERPL-ACNC: <1 IU/L (ref 0–5)
BASOPHILS # BLD AUTO: 0 10E3/UL (ref 0–0.2)
BASOPHILS NFR BLD AUTO: 1 %
BILIRUB SERPL-MCNC: 0.4 MG/DL (ref 0.2–1.3)
BUN SERPL-MCNC: 9 MG/DL (ref 7–30)
CALCIUM SERPL-MCNC: 9.2 MG/DL (ref 8.5–10.1)
CHLORIDE BLD-SCNC: 105 MMOL/L (ref 94–109)
CHOLEST SERPL-MCNC: 194 MG/DL
CO2 SERPL-SCNC: 28 MMOL/L (ref 20–32)
CREAT SERPL-MCNC: 0.59 MG/DL (ref 0.52–1.04)
EOSINOPHIL # BLD AUTO: 0 10E3/UL (ref 0–0.7)
EOSINOPHIL NFR BLD AUTO: 1 %
ERYTHROCYTE [DISTWIDTH] IN BLOOD BY AUTOMATED COUNT: 12 % (ref 10–15)
FASTING STATUS PATIENT QL REPORTED: YES
GFR SERPL CREATININE-BSD FRML MDRD: >90 ML/MIN/1.73M2
GLUCOSE BLD-MCNC: 101 MG/DL (ref 70–99)
HCG UR QL: NEGATIVE
HCT VFR BLD AUTO: 41.9 % (ref 35–47)
HDLC SERPL-MCNC: 91 MG/DL
HGB BLD-MCNC: 13.5 G/DL (ref 11.7–15.7)
IMM GRANULOCYTES # BLD: 0 10E3/UL
IMM GRANULOCYTES NFR BLD: 0 %
LDLC SERPL CALC-MCNC: 95 MG/DL
LYMPHOCYTES # BLD AUTO: 1.1 10E3/UL (ref 0.8–5.3)
LYMPHOCYTES NFR BLD AUTO: 34 %
MCH RBC QN AUTO: 28.9 PG (ref 26.5–33)
MCHC RBC AUTO-ENTMCNC: 32.2 G/DL (ref 31.5–36.5)
MCV RBC AUTO: 90 FL (ref 78–100)
MONOCYTES # BLD AUTO: 0.2 10E3/UL (ref 0–1.3)
MONOCYTES NFR BLD AUTO: 6 %
NEUTROPHILS # BLD AUTO: 1.9 10E3/UL (ref 1.6–8.3)
NEUTROPHILS NFR BLD AUTO: 58 %
NONHDLC SERPL-MCNC: 103 MG/DL
NRBC # BLD AUTO: 0 10E3/UL
NRBC BLD AUTO-RTO: 0 /100
PLATELET # BLD AUTO: 281 10E3/UL (ref 150–450)
POTASSIUM BLD-SCNC: 4.1 MMOL/L (ref 3.4–5.3)
PROT SERPL-MCNC: 7 G/DL (ref 6.8–8.8)
RBC # BLD AUTO: 4.67 10E6/UL (ref 3.8–5.2)
SODIUM SERPL-SCNC: 139 MMOL/L (ref 133–144)
TRIGL SERPL-MCNC: 41 MG/DL
WBC # BLD AUTO: 3.3 10E3/UL (ref 4–11)

## 2022-04-27 PROCEDURE — 80053 COMPREHEN METABOLIC PANEL: CPT | Performed by: PATHOLOGY

## 2022-04-27 PROCEDURE — 36415 COLL VENOUS BLD VENIPUNCTURE: CPT | Performed by: PATHOLOGY

## 2022-04-27 PROCEDURE — 84702 CHORIONIC GONADOTROPIN TEST: CPT | Performed by: PATHOLOGY

## 2022-04-27 PROCEDURE — 80061 LIPID PANEL: CPT | Performed by: PATHOLOGY

## 2022-04-27 PROCEDURE — 85025 COMPLETE CBC W/AUTO DIFF WBC: CPT | Performed by: PATHOLOGY

## 2022-04-27 PROCEDURE — 81025 URINE PREGNANCY TEST: CPT | Performed by: PATHOLOGY

## 2022-04-27 PROCEDURE — 99214 OFFICE O/P EST MOD 30 MIN: CPT | Performed by: DERMATOLOGY

## 2022-04-27 RX ORDER — ISOTRETINOIN 40 MG/1
40 CAPSULE ORAL 2 TIMES DAILY
Qty: 60 CAPSULE | Refills: 0 | Status: SHIPPED | OUTPATIENT
Start: 2022-04-27 | End: 2022-05-27

## 2022-04-27 ASSESSMENT — PAIN SCALES - GENERAL: PAINLEVEL: NO PAIN (0)

## 2022-04-27 NOTE — PROGRESS NOTES
[facility] Dermatology Note  Encounter Date: Apr 27, 2022  Office Visit     Dermatology Problem List:  1. Acne vulgaris, inflammatory  - Current tx:  isotretinoin 40 mg daily (month #1), isotretinoin 40 mg bid (month #2)  - Previous tx: Accutane (took for 1 month x2, stopped due to insurance issues), doxycycline, Tami, Clindamycin 1% lotion, tretinoin 0.025% cream  - Contraception: abstinence   ____________________________________________    Assessment & Plan:    # Acne vulgaris, chronic, active, improving, doing well on isotretinoin.  Some eye dryness but otherwise tolerating well. Try increasing 1 mg/kg dosing today. If unable to tolerate, reduce to 40 mg daily  - Recommended to start Refresh brand eye drops or other artificial tears prn for eye dryness. Advised patient could also consult her eye doctor for recommendations.  - Labs ordered: CBC, CMP, lipids, HCG quantitative  - Increase to 40 mg twice daily for month #2. One month supply with no refills provided.   - Goal dose is 10,500 to 15,400 mg for 150-220  mg/kg dosing in this 70 kg patient.   - Cumulative dose: 1200 mg.     Procedures Performed:   None      Follow-up: 1 month(s) in-person, or earlier for new or changing lesions    Staff and Scribe:     Scribe Disclosure:  I, Michelle West, am serving as a scribe to document services personally performed by Debby Cutler MD based on data collection and the provider's statements to me.     Provider Disclosure:   The documentation recorded by the scribe accurately reflects the services I personally performed and the decisions made by me.    Debby Cutler MD    Department of Dermatology  [facility]: Phone: [phone], Fax: [phone]  4/27/2022     ____________________________________________    CC: Accutane    HPI:  Ms. Emiliana Helms is a(n) 21 year old female who presents today as a  return patient for accutane follow up. The patient was last seen in dermatology on 3/29/2022 by Dr. Miner, at which time she was advised to discontinue topical clindamycin and tretinoin and start isotretinoin 40 mg daily for treatment of acne.     Today, the patient states that she is almost finished with the first month of Accutane. She reports some persistent acne around the mouth, but states that this is improving and is significantly improved from prior. Patient notes that her only side effect is eye dryness, which is primarily a problem because she wears contact lenses.     She otherwise denies headache, myalgias, arthralgias, GI upset, and mood changes.     Patient is otherwise feeling well, without additional skin concerns.    Labs Reviewed:  N/A    Physical Exam:  Vitals: There were no vitals taken for this visit.  SKIN: Focused examination of the face was performed.  - Acneiform papules on bilateral cheeks.   - No other lesions of concern on areas examined.     Medications:  Current Outpatient Medications   Medication     buPROPion (WELLBUTRIN XL) 150 MG 24 hr tablet     busPIRone (BUSPAR) 5 MG tablet     cetirizine (ZYRTEC) 10 MG tablet     clindamycin (CLEOCIN T) 1 % external lotion     ISOtretinoin (ACCUTANE) 40 MG capsule     tretinoin (RETIN-A) 0.025 % external cream     No current facility-administered medications for this visit.      Past Medical History:   Patient Active Problem List   Diagnosis     Other idiopathic scoliosis, thoracolumbar region     Intermittent asthma, well controlled     Vitamin D deficiency     Major depression, single episode     Absence attack (H)     Exercise intolerance     Social anxiety disorder     Seasonal allergies     Past Medical History:   Diagnosis Date     Asthma, intermittent      Constipation 06/26/2011     Contact with or exposure to tuberculosis 12/2003    on INH ppd neg     Mild persistent asthma 08/20/2012     Other idiopathic scoliosis, thoracolumbar  region 2014     Undiagnosed cardiac murmurs     tiny PFO     Vitamin D deficiency 2017    vitamin D 15

## 2022-04-27 NOTE — NURSING NOTE
Dermatology Rooming Note    Emiliana Helms's goals for this visit include:   Chief Complaint   Patient presents with     Dinesh Estrada, Visit Facilitator

## 2022-04-27 NOTE — LETTER
4/27/2022       RE: Emiliana Helms  [address] [facility] [address]     Dear Colleague,    Thank you for referring your patient, Emiliana Helms, to the [facility] DERMATOLOGY CLINIC [place] [facility]. Please see a copy of my visit note below.    [facility] Dermatology Note  Encounter Date: Apr 27, 2022  Office Visit     Dermatology Problem List:  1. Acne vulgaris, inflammatory  - Current tx:  isotretinoin 40 mg daily (month #1), isotretinoin 40 mg bid (month #2)  - Previous tx: Accutane (took for 1 month x2, stopped due to insurance issues), doxycycline, Tami, Clindamycin 1% lotion, tretinoin 0.025% cream  - Contraception: abstinence   ____________________________________________    Assessment & Plan:    # Acne vulgaris, chronic, active, improving, doing well on isotretinoin.  Some eye dryness but otherwise tolerating well. Try increasing 1 mg/kg dosing today. If unable to tolerate, reduce to 40 mg daily  - Recommended to start Refresh brand eye drops or other artificial tears prn for eye dryness. Advised patient could also consult her eye doctor for recommendations.  - Labs ordered: CBC, CMP, lipids, HCG quantitative  - Increase to 40 mg twice daily for month #2. One month supply with no refills provided.   - Goal dose is 10,500 to 15,400 mg for 150-220  mg/kg dosing in this 70 kg patient.   - Cumulative dose: 1200 mg.     Procedures Performed:   None      Follow-up: 1 month(s) in-person, or earlier for new or changing lesions    Staff and Scribe:     Scribe Disclosure:  I, Michelle West, am serving as a scribe to document services personally performed by Debby Cutler MD based on data collection and the provider's statements to me.     Provider Disclosure:   The documentation recorded by the scribe accurately reflects the services I personally performed and the decisions made by me.    Debby Cutler MD  Assistant  Professor  Department of Dermatology  [facility]: Phone: [phone], Fax: [phone]  4/27/2022     ____________________________________________    CC: Accutane    HPI:  Ms. Emiliana Helms is a(n) 21 year old female who presents today as a return patient for accutane follow up. The patient was last seen in dermatology on 3/29/2022 by Dr. Miner, at which time she was advised to discontinue topical clindamycin and tretinoin and start isotretinoin 40 mg daily for treatment of acne.     Today, the patient states that she is almost finished with the first month of Accutane. She reports some persistent acne around the mouth, but states that this is improving and is significantly improved from prior. Patient notes that her only side effect is eye dryness, which is primarily a problem because she wears contact lenses.     She otherwise denies headache, myalgias, arthralgias, GI upset, and mood changes.     Patient is otherwise feeling well, without additional skin concerns.    Labs Reviewed:  N/A    Physical Exam:  Vitals: There were no vitals taken for this visit.  SKIN: Focused examination of the face was performed.  - Acneiform papules on bilateral cheeks.   - No other lesions of concern on areas examined.     Medications:  Current Outpatient Medications   Medication     buPROPion (WELLBUTRIN XL) 150 MG 24 hr tablet     busPIRone (BUSPAR) 5 MG tablet     cetirizine (ZYRTEC) 10 MG tablet     clindamycin (CLEOCIN T) 1 % external lotion     ISOtretinoin (ACCUTANE) 40 MG capsule     tretinoin (RETIN-A) 0.025 % external cream     No current facility-administered medications for this visit.      Past Medical History:   Patient Active Problem List   Diagnosis     Other idiopathic scoliosis, thoracolumbar region     Intermittent asthma, well controlled     Vitamin D deficiency     Major depression, single episode     Absence attack (H)     Exercise  intolerance     Social anxiety disorder     Seasonal allergies     Past Medical History:   Diagnosis Date     Asthma, intermittent      Constipation 06/26/2011     Contact with or exposure to tuberculosis 12/2003    on INH ppd neg     Mild persistent asthma 08/20/2012     Other idiopathic scoliosis, thoracolumbar region 2014     Undiagnosed cardiac murmurs     tiny PFO     Vitamin D deficiency 2017    vitamin D 15

## 2022-05-27 ENCOUNTER — VIRTUAL VISIT (OUTPATIENT)
Dept: DERMATOLOGY | Facility: CLINIC | Age: 22
End: 2022-05-27
Payer: COMMERCIAL

## 2022-05-27 DIAGNOSIS — L70.0 ACNE VULGARIS: Primary | ICD-10-CM

## 2022-05-27 DIAGNOSIS — D72.810 LYMPHOPENIA: ICD-10-CM

## 2022-05-27 PROCEDURE — 99214 OFFICE O/P EST MOD 30 MIN: CPT | Mod: 95 | Performed by: DERMATOLOGY

## 2022-05-27 RX ORDER — ISOTRETINOIN 40 MG/1
40 CAPSULE ORAL DAILY
Qty: 30 CAPSULE | Refills: 0 | Status: SHIPPED | OUTPATIENT
Start: 2022-05-27

## 2022-05-27 NOTE — NURSING NOTE
Dermatology Rooming Note    Emiliana Helms's goals for this visit include:   Chief Complaint   Patient presents with     Accutane     Emiliana is here for an accutane follow-up. States her skin is dryer compared to her last visit.     Hamzah Carson, EMT

## 2022-05-27 NOTE — PROGRESS NOTES
[facility] Dermatology Note  Encounter Date: May 27, 2022   [employer]-and[employer] and Telephone ([phone]). Location of teledermatologist: [facility] [place].  Start time: 1:30 PM. End time: 1:40 PM.    Dermatology Problem List:  1. Acne vulgaris, inflammatory  - Current tx:  isotretinoin (40-80-40 due to dry eyes/dry skin)  - Previous tx: Accutane (took for 1 month x2, stopped due to insurance issues), doxycycline, Tami, Clindamycin 1% lotion, tretinoin 0.025% cream  - Contraception: abstinence   ____________________________________________    Assessment & Plan:    # Acne vulgaris, chronic, active, improving, doing well on isotretinoin.  Tried to go up to 40 mg BID dosing but too much dryness so went back to 40 mg daily and doing better. We will start at 40 mg daily dosing.  - Labs reviewed: CBC, CMP, lipids, HCG quantitative from 4/27/2022  - Labs ordered: HCG qualitative, CBC and smear (given lymphopenia below).  - Continue to 40 mg once daily for month #3. One month supply with no refills provided.   - Goal dose is 10,500 to 15,400 mg for 150-220  mg/kg dosing in this 70 kg patient.   - Cumulative dose: 2400 mg.    # Lymphopenia.  Intermittent and predating isotretinoin, she notes her father and sibling also have issues and has been told she has low blood counts before. ?benign ethnic neutropenia. Advised CBC and smear with potential for heme referral pending results.  - CBC and smear  - consider heme referral    Procedures Performed:   None    Follow-up: 1 month(s) in-person, or earlier for new or changing lesions    Staff and Scribe:     Scribe Disclosure:  I, Michelle West, am serving as a scribe to document services personally performed by Debby Cutler MD based on data collection and the provider's statements to me.     Provider Disclosure:   The documentation recorded by the scribe accurately reflects the services I personally performed and the  decisions made by me.    Debby Cutler MD    Department of Dermatology  [facility]: Phone: [phone], Fax: [phone]  5/31/2022     ____________________________________________    CC: Accutane (Emiliana is here for an accutane follow-up. States her skin is dryer compared to her last visit.)    HPI:  Ms. Emiliana Helms is a(n) 21 year old female who presents today as a return patient for accutane follow up. The patient was last seen in dermatology on 4/27/2022 by myself, at which time she was advised to increase to 40 mg isotretinoin daily for treatment of acne.     Today, the patient states that she tried to increase to 40 mg twice daily, but her skin got very dry so she went back to once daily and is doing well on this dose. She notes that when she takes 40 mg bid, her eyes get so dry that it is painful for her to wear contact lenses. Patient otherwise reports that her acne is continuing to improve on this medication. She denies headaches, vision changes, GI upset, diarrhea, mood changes, and suicidal ideation. Patient is otherwise feeling well, without additional skin concerns.    Labs Reviewed:  N/A    Physical Exam:  Vitals: There were no vitals taken for this visit.  SKIN: no photos sent.  - No other lesions of concern on areas examined.     Medications:  Current Outpatient Medications   Medication     buPROPion (WELLBUTRIN XL) 150 MG 24 hr tablet     ISOtretinoin (ACCUTANE) 40 MG capsule     busPIRone (BUSPAR) 5 MG tablet     cetirizine (ZYRTEC) 10 MG tablet     clindamycin (CLEOCIN T) 1 % external lotion     tretinoin (RETIN-A) 0.025 % external cream     No current facility-administered medications for this visit.      Past Medical History:   Patient Active Problem List   Diagnosis     Other idiopathic scoliosis, thoracolumbar region     Intermittent asthma, well controlled     Vitamin D deficiency     Major  depression, single episode     Absence attack (H)     Exercise intolerance     Social anxiety disorder     Seasonal allergies     Past Medical History:   Diagnosis Date     Asthma, intermittent      Constipation 06/26/2011     Contact with or exposure to tuberculosis 12/2003    on INH ppd neg     Mild persistent asthma 08/20/2012     Other idiopathic scoliosis, thoracolumbar region 2014     Undiagnosed cardiac murmurs     tiny PFO     Vitamin D deficiency 2017    vitamin D 15        CC No referring provider defined for this encounter. on close of this encounter.

## 2022-05-31 ENCOUNTER — LAB (OUTPATIENT)
Dept: LAB | Facility: CLINIC | Age: 22
End: 2022-05-31
Payer: COMMERCIAL

## 2022-05-31 DIAGNOSIS — D72.810 LYMPHOPENIA: ICD-10-CM

## 2022-05-31 DIAGNOSIS — L70.0 ACNE VULGARIS: ICD-10-CM

## 2022-05-31 DIAGNOSIS — Z51.81 MEDICATION MONITORING ENCOUNTER: ICD-10-CM

## 2022-05-31 LAB
BASOPHILS # BLD AUTO: 0 10E3/UL (ref 0–0.2)
BASOPHILS NFR BLD AUTO: 1 %
EOSINOPHIL # BLD AUTO: 0.1 10E3/UL (ref 0–0.7)
EOSINOPHIL NFR BLD AUTO: 2 %
ERYTHROCYTE [DISTWIDTH] IN BLOOD BY AUTOMATED COUNT: 12.3 % (ref 10–15)
HCG UR QL: NEGATIVE
HCT VFR BLD AUTO: 40.9 % (ref 35–47)
HGB BLD-MCNC: 13.7 G/DL (ref 11.7–15.7)
IMM GRANULOCYTES # BLD: 0 10E3/UL
IMM GRANULOCYTES NFR BLD: 0 %
LYMPHOCYTES # BLD AUTO: 1.9 10E3/UL (ref 0.8–5.3)
LYMPHOCYTES NFR BLD AUTO: 41 %
MCH RBC QN AUTO: 29.3 PG (ref 26.5–33)
MCHC RBC AUTO-ENTMCNC: 33.5 G/DL (ref 31.5–36.5)
MCV RBC AUTO: 88 FL (ref 78–100)
MONOCYTES # BLD AUTO: 0.2 10E3/UL (ref 0–1.3)
MONOCYTES NFR BLD AUTO: 5 %
NEUTROPHILS # BLD AUTO: 2.4 10E3/UL (ref 1.6–8.3)
NEUTROPHILS NFR BLD AUTO: 51 %
NRBC # BLD AUTO: 0 10E3/UL
NRBC BLD AUTO-RTO: 0 /100
PLATELET # BLD AUTO: 232 10E3/UL (ref 150–450)
RBC # BLD AUTO: 4.67 10E6/UL (ref 3.8–5.2)
RETICS # AUTO: 0.03 10E6/UL (ref 0.03–0.1)
RETICS/RBC NFR AUTO: 0.7 % (ref 0.5–2)
WBC # BLD AUTO: 4.6 10E3/UL (ref 4–11)

## 2022-05-31 PROCEDURE — 85060 BLOOD SMEAR INTERPRETATION: CPT | Performed by: PATHOLOGY

## 2022-05-31 PROCEDURE — 85025 COMPLETE CBC W/AUTO DIFF WBC: CPT | Performed by: PATHOLOGY

## 2022-05-31 PROCEDURE — 36415 COLL VENOUS BLD VENIPUNCTURE: CPT | Performed by: PATHOLOGY

## 2022-05-31 PROCEDURE — 81025 URINE PREGNANCY TEST: CPT | Performed by: PATHOLOGY

## 2022-05-31 PROCEDURE — 85045 AUTOMATED RETICULOCYTE COUNT: CPT | Performed by: PATHOLOGY

## 2022-06-01 LAB
PATH REPORT.COMMENTS IMP SPEC: NORMAL
PATH REPORT.FINAL DX SPEC: NORMAL
PATH REPORT.MICROSCOPIC SPEC OTHER STN: NORMAL
PATH REPORT.MICROSCOPIC SPEC OTHER STN: NORMAL
PATH REPORT.RELEVANT HX SPEC: NORMAL

## 2022-06-22 ENCOUNTER — OFFICE VISIT (OUTPATIENT)
Dept: DERMATOLOGY | Facility: CLINIC | Age: 22
End: 2022-06-22
Payer: COMMERCIAL

## 2022-06-22 ENCOUNTER — LAB (OUTPATIENT)
Dept: LAB | Facility: CLINIC | Age: 22
End: 2022-06-22
Payer: COMMERCIAL

## 2022-06-22 DIAGNOSIS — Z51.81 MEDICATION MONITORING ENCOUNTER: ICD-10-CM

## 2022-06-22 DIAGNOSIS — L81.9 HYPERPIGMENTATION: ICD-10-CM

## 2022-06-22 DIAGNOSIS — L30.9 DERMATITIS: ICD-10-CM

## 2022-06-22 DIAGNOSIS — L70.0 ACNE VULGARIS: Primary | ICD-10-CM

## 2022-06-22 DIAGNOSIS — L70.0 ACNE VULGARIS: ICD-10-CM

## 2022-06-22 LAB — HCG UR QL: NEGATIVE

## 2022-06-22 PROCEDURE — 81025 URINE PREGNANCY TEST: CPT | Performed by: PATHOLOGY

## 2022-06-22 PROCEDURE — 99214 OFFICE O/P EST MOD 30 MIN: CPT | Mod: GC | Performed by: DERMATOLOGY

## 2022-06-22 RX ORDER — TRIAMCINOLONE ACETONIDE 0.25 MG/G
CREAM TOPICAL 2 TIMES DAILY
Qty: 80 G | Refills: 0 | Status: SHIPPED | OUTPATIENT
Start: 2022-06-22

## 2022-06-22 RX ORDER — ISOTRETINOIN 30 MG/1
30 CAPSULE ORAL 2 TIMES DAILY
Qty: 60 CAPSULE | Refills: 0 | Status: SHIPPED | OUTPATIENT
Start: 2022-06-22

## 2022-06-22 ASSESSMENT — PAIN SCALES - GENERAL: PAINLEVEL: NO PAIN (0)

## 2022-06-22 NOTE — LETTER
6/22/2022       RE: Emiliana Helms  [address]  [facility] [address]     Dear Colleague,    Thank you for referring your patient, Emiliana Helms, to the [facility] [place] [facility]. Please see a copy of my visit note below.    [facility] Dermatology Note  Encounter Date: Jun 22, 2022  Office Visit     Dermatology Problem List:  1. Acne vulgaris, inflammatory  - Current tx:  isotretinoin (40-80-40 due to dry eyes/dry skin)--trial of 60 mg daily starting 6/22/2022; tolerated 40 mg daily well.  - Previous tx: Accutane (took for 1 month x2, stopped due to insurance issues), doxycycline, Tami, Clindamycin 1% lotion, tretinoin 0.025% cream  - Contraception: abstinence     ____________________________________________    Assessment & Plan:    # Acne vulgaris, chronic, active, improving, doing well on isotretinoin.  Tried to go up to 40 mg BID dosing but too much dryness so went back to 40 mg daily and doing better. We will try 60 mg daily dosing.  - Labs ordered: HCG qualitative  - Increase isotretinoin to 60 mg once daily for month #4. One month supply with no refills provided. Counseled on decreasing back to 30 mg if not tolerating with resumption of 40 mg dose at next visit if 60 mg causes dryness.  - Goal dose is 10,500 to 15,400 mg for 150-220  mg/kg dosing in this 70 kg patient.   - Cumulative dose: 3600 mg.    # Hyperpigmentation, perioral.  Question melasma vs dry skin side effects of Accutane.   - Start triamcinolone cream 0.025% BID to affected area, recheck in 1 month.  - Continue photoprotection     # Lymphopenia.  Intermittent and predating isotretinoin, she notes her father and sibling also have issues and has been told she has low blood counts before. ?benign ethnic neutropenia. Resolved with recent CBC/smear in May 2022.  - Labs reviewed: CBC, peripheral smear from 5/31/2022    Procedures Performed:    None.    Follow-up: 1 month(s) in-person, or earlier for new or changing lesions    Staff, Resident and Scribe:     Scribe Disclosure:  I, Debbie Enrique, am serving as a scribe to document services personally performed by Debby Cutler MD based on data collection and the provider's statements to me.     Preceted with Dr. Cutler.    Tiffanie Paris MD  Pager # [phone]  [facility]    Staff Physician Comments:   I saw and evaluated the patient with the resident and I agree with the assessment and plan.  I was present for the examination.    Debby Cutler MD    Department of Dermatology  [facility]: Phone: [phone], Fax: [phone]  6/22/2022     ____________________________________________    CC: Accutane (Emiliana is here for a follow up on her Accutane.  States that the area around her mouth is getting darker.  Feels like skin is getting more wrinkly and dry.)    HPI:  Ms. Emiliana Helms is a(n) 21 year old female who presents today as a return patient for acne, on accutane. Last seen by myself on 5/27/22 at which point she was continued on Accutane 40 mg daily for acne.     Noticing some darkening of skin on forehead, and around lips--but also throughout face, sparing under her eyes  Denies significant recent sun exposure, wears sunscreen    ? Melasma  ? Dry skin from acutane causing lichenification/dry skin.    Try topical corticosteroid; has not tried anything herself    Patient is otherwise feeling well, without additional skin concerns.    Labs Reviewed:  N/A    Physical Exam:  Vitals: There were no vitals taken for this visit.  SKIN: Acne exam, which includes the face, pt declines exam of chest/neck/back as no areas of concern was performed.  - No significant comedones, pustules or nodules present.  - Slight hyperpigmentation of skin surrounding mouth.    .   - No other  lesions of concern on areas examined.     Medications:  Current Outpatient Medications   Medication     buPROPion (WELLBUTRIN XL) 150 MG 24 hr tablet     ISOtretinoin (ABSORICA) 30 MG capsule     ISOtretinoin (ACCUTANE) 40 MG capsule     triamcinolone (KENALOG) 0.025 % cream     busPIRone (BUSPAR) 5 MG tablet     cetirizine (ZYRTEC) 10 MG tablet     clindamycin (CLEOCIN T) 1 % external lotion     tretinoin (RETIN-A) 0.025 % external cream     No current facility-administered medications for this visit.      Past Medical History:   Patient Active Problem List   Diagnosis     Other idiopathic scoliosis, thoracolumbar region     Intermittent asthma, well controlled     Vitamin D deficiency     Major depression, single episode     Absence attack (H)     Exercise intolerance     Social anxiety disorder     Seasonal allergies     Past Medical History:   Diagnosis Date     Asthma, intermittent      Constipation 06/26/2011     Contact with or exposure to tuberculosis 12/2003    on INH ppd neg     Mild persistent asthma 08/20/2012     Other idiopathic scoliosis, thoracolumbar region 2014     Undiagnosed cardiac murmurs     tiny PFO     Vitamin D deficiency 2017    vitamin D 15        CC Referred Self, MD  No address on file on close of this encounter.        Again, thank you for allowing me to participate in the care of your patient.      Sincerely,    Debby Cutler MD

## 2022-06-22 NOTE — LETTER
Date:June 23, 2022      Patient was self referred, no letter generated. Do not send.        [facility] Health Information

## 2022-06-22 NOTE — NURSING NOTE
Dermatology Rooming Note    Emiliana Helms's goals for this visit include:   Chief Complaint   Patient presents with     Accutane     Emiliana is here for a follow up on her Accutane.  States that the area around her mouth is getting darker.  Feels like skin is getting more wrinkly and dry.     Naomi Angel, CMA

## 2022-06-22 NOTE — PROGRESS NOTES
[facility] Dermatology Note  Encounter Date: Jun 22, 2022  Office Visit     Dermatology Problem List:  1. Acne vulgaris, inflammatory  - Current tx:  isotretinoin (40-80-40 due to dry eyes/dry skin)--trial of 60 mg daily starting 6/22/2022; tolerated 40 mg daily well.  - Previous tx: Accutane (took for 1 month x2, stopped due to insurance issues), doxycycline, Tami, Clindamycin 1% lotion, tretinoin 0.025% cream  - Contraception: abstinence     ____________________________________________    Assessment & Plan:    # Acne vulgaris, chronic, active, improving, doing well on isotretinoin.  Tried to go up to 40 mg BID dosing but too much dryness so went back to 40 mg daily and doing better. We will try 60 mg daily dosing.  - Labs ordered: HCG qualitative  - Increase isotretinoin to 60 mg once daily for month #4. One month supply with no refills provided. Counseled on decreasing back to 30 mg if not tolerating with resumption of 40 mg dose at next visit if 60 mg causes dryness.  - Goal dose is 10,500 to 15,400 mg for 150-220  mg/kg dosing in this 70 kg patient.   - Cumulative dose: 3600 mg.    # Hyperpigmentation, perioral.  Question melasma vs dry skin side effects of Accutane.   - Start triamcinolone cream 0.025% BID to affected area, recheck in 1 month.  - Continue photoprotection     # Lymphopenia.  Intermittent and predating isotretinoin, she notes her father and sibling also have issues and has been told she has low blood counts before. ?benign ethnic neutropenia. Resolved with recent CBC/smear in May 2022.  - Labs reviewed: CBC, peripheral smear from 5/31/2022    Procedures Performed:   None.    Follow-up: 1 month(s) in-person, or earlier for new or changing lesions    Staff, Resident and Scribe:     Scribe Disclosure:  IDebbie, am serving as a scribe to document services personally performed by Debby Cutler MD based on data collection and the provider's statements to me.      Preceted with Dr. Cutler.    Tiffanie Paris MD  Pager # [phone]  [facility]    Staff Physician Comments:   I saw and evaluated the patient with the resident and I agree with the assessment and plan.  I was present for the examination.    Debby Cutler MD    Department of Dermatology  [facility]: Phone: [phone], Fax: [phone]  6/22/2022     ____________________________________________    CC: Accutane (Emiliana is here for a follow up on her Accutane.  States that the area around her mouth is getting darker.  Feels like skin is getting more wrinkly and dry.)    HPI:  Ms. Emiliana Helms is a(n) 21 year old female who presents today as a return patient for acne, on accutane. Last seen by myself on 5/27/22 at which point she was continued on Accutane 40 mg daily for acne.     Noticing some darkening of skin on forehead, and around lips--but also throughout face, sparing under her eyes  Denies significant recent sun exposure, wears sunscreen    ? Melasma  ? Dry skin from acutane causing lichenification/dry skin.    Try topical corticosteroid; has not tried anything herself    Patient is otherwise feeling well, without additional skin concerns.    Labs Reviewed:  N/A    Physical Exam:  Vitals: There were no vitals taken for this visit.  SKIN: Acne exam, which includes the face, pt declines exam of chest/neck/back as no areas of concern was performed.  - No significant comedones, pustules or nodules present.  - Slight hyperpigmentation of skin surrounding mouth.    .   - No other lesions of concern on areas examined.     Medications:  Current Outpatient Medications   Medication     buPROPion (WELLBUTRIN XL) 150 MG 24 hr tablet     ISOtretinoin (ABSORICA) 30 MG capsule     ISOtretinoin (ACCUTANE) 40 MG capsule     triamcinolone (KENALOG) 0.025 % cream     busPIRone (BUSPAR) 5 MG tablet      cetirizine (ZYRTEC) 10 MG tablet     clindamycin (CLEOCIN T) 1 % external lotion     tretinoin (RETIN-A) 0.025 % external cream     No current facility-administered medications for this visit.      Past Medical History:   Patient Active Problem List   Diagnosis     Other idiopathic scoliosis, thoracolumbar region     Intermittent asthma, well controlled     Vitamin D deficiency     Major depression, single episode     Absence attack (H)     Exercise intolerance     Social anxiety disorder     Seasonal allergies     Past Medical History:   Diagnosis Date     Asthma, intermittent      Constipation 06/26/2011     Contact with or exposure to tuberculosis 12/2003    on INH ppd neg     Mild persistent asthma 08/20/2012     Other idiopathic scoliosis, thoracolumbar region 2014     Undiagnosed cardiac murmurs     tiny PFO     Vitamin D deficiency 2017    vitamin D 15        CC Referred Self, MD  No address on file on close of this encounter.

## 2022-06-26 ENCOUNTER — HEALTH MAINTENANCE LETTER (OUTPATIENT)
Age: 22
End: 2022-06-26

## 2022-09-14 ENCOUNTER — OFFICE VISIT (OUTPATIENT)
Dept: DERMATOLOGY | Facility: CLINIC | Age: 22
End: 2022-09-14
Payer: COMMERCIAL

## 2022-09-14 DIAGNOSIS — L70.0 ACNE VULGARIS: ICD-10-CM

## 2022-09-14 PROCEDURE — 99214 OFFICE O/P EST MOD 30 MIN: CPT | Performed by: DERMATOLOGY

## 2022-09-14 RX ORDER — ADAPALENE 0.1 G/100G
CREAM TOPICAL
Qty: 45 G | Refills: 11 | Status: SHIPPED | OUTPATIENT
Start: 2022-09-14 | End: 2024-05-10

## 2022-09-14 RX ORDER — TRETINOIN 0.25 MG/G
CREAM TOPICAL
Qty: 45 G | Refills: 11 | Status: SHIPPED | OUTPATIENT
Start: 2022-09-14 | End: 2024-02-10

## 2022-09-14 RX ORDER — CLINDAMYCIN PHOSPHATE 10 UG/ML
LOTION TOPICAL DAILY
Qty: 60 ML | Refills: 11 | Status: SHIPPED | OUTPATIENT
Start: 2022-09-14 | End: 2024-09-06

## 2022-09-14 ASSESSMENT — PAIN SCALES - GENERAL: PAINLEVEL: NO PAIN (0)

## 2022-09-14 NOTE — LETTER
Date:September 19, 2022      Patient was self referred, no letter generated. Do not send.        [facility] Health Information

## 2022-09-14 NOTE — PROGRESS NOTES
[facility] Dermatology Note  Encounter Date: Sep 14, 2022  Office Visit     Dermatology Problem List:  1. Acne vulgaris, inflammatory  - Current tx:  Clindamycin 1% lotion, tretinoin 0.025% cream, BP wash  - Previous tx: Accutane (took for 1 month x2, stopped due to insurance issues; resumed 5/2022-7/2022 and discontinued due to dry skin; cumulative dose 70 mg/kg)    ____________________________________________    Assessment & Plan:    # Acne vulgaris, chronic, active.  Since last visit, discontinued isotretinoin due to dry skin. Some acne breakouts recently but better than prior to accutane). Discussed resuming accutane v restarting topicals; she prefers the latter, will let us know if flares to point she would like to resume.  - Start tretinoin 0.025% cream or Differin 0.1% cream nightly (pending which is better with insurance)  - Start clindamycin 1% lotion daily in AM  - Start OTC BP wash  - Future: consider resuming isotretinoin    Procedures Performed:   None    Follow-up: 6 month(s) in-person, or earlier for new or changing lesions    Staff and Scribe:     Scribe Disclosure:  I, Richard Licnoa, am serving as a scribe to document services personally performed by Debby Cutler MD based on data collection and the provider's statements to me.     Provider Disclosure:   The documentation recorded by the scribe accurately reflects the services I personally performed and the decisions made by me.    Debby Cutler MD    Department of Dermatology  [facility]: Phone: [phone], Fax: [phone]  9/19/2022     ____________________________________________    CC: Derm Problem (Acne - stopped Accutane back in June due to dryness. Would like to discuss other treatment options. )    HPI:  Ms. Emiliana Helms is a(n) 21 year old female who presents today as a return patient for acne follow-up. Last  seen by me on 6/22/22, at which time patient increased isotretinoin to 60 mg daily for treatment of acne vulgaris. Additionally, the patient was started on triamcinolone cream 0.025% BID for the treatment of perioral hyperpigmentation.    Today, patient reported that she discontinued Accutane due to dryness approximately 2 months ago. She reports that her oily skin has returned and has had a couple of break outs during the last week that were not around her period and not location specific on the face. Patient was hoping to get clindamycin for topical treatment and would want to wait at least one year if they were to start Accutane again.     Patient is otherwise feeling well, without additional skin concerns.    Labs Reviewed:  N/A    Physical Exam:  Vitals: There were no vitals taken for this visit.  SKIN: Focused examination of face was performed.  - One acneiform papule, left temple.  - Closed comedones on cheek obscured by makeup.  - No other lesions of concern on areas examined.     Medications:  Current Outpatient Medications   Medication     adapalene (DIFFERIN) 0.1 % external cream     buPROPion (WELLBUTRIN XL) 150 MG 24 hr tablet     clindamycin (CLEOCIN T) 1 % external lotion     tretinoin (RETIN-A) 0.025 % external cream     busPIRone (BUSPAR) 5 MG tablet     cetirizine (ZYRTEC) 10 MG tablet     ISOtretinoin (ABSORICA) 30 MG capsule     ISOtretinoin (ACCUTANE) 40 MG capsule     triamcinolone (KENALOG) 0.025 % cream     No current facility-administered medications for this visit.      Past Medical History:   Patient Active Problem List   Diagnosis     Other idiopathic scoliosis, thoracolumbar region     Intermittent asthma, well controlled     Vitamin D deficiency     Major depression, single episode     Absence attack (H)     Exercise intolerance     Social anxiety disorder     Seasonal allergies     Past Medical History:   Diagnosis Date     Asthma, intermittent      Constipation 06/26/2011     Contact  with or exposure to tuberculosis 12/2003    on INH ppd neg     Mild persistent asthma 08/20/2012     Other idiopathic scoliosis, thoracolumbar region 2014     Undiagnosed cardiac murmurs     tiny PFO     Vitamin D deficiency 2017    vitamin D 15        CC Referred Self, MD  No address on file on close of this encounter.

## 2022-09-14 NOTE — NURSING NOTE
Dermatology Rooming Note    Emiliana Helms's goals for this visit include:   Chief Complaint   Patient presents with     Derm Problem     Acne - stopped Accutane back in June due to dryness. Would like to discuss other treatment options.      Juliann Gilmore, CMA

## 2022-09-14 NOTE — LETTER
9/14/2022       RE: Emiliana Helms  [address] [facility] [address]     Dear Colleague,    Thank you for referring your patient, Emiliana Helms, to the [facility] DERMATOLOGY CLINIC [place] at [facility]. Please see a copy of my visit note below.    [facility] Dermatology Note  Encounter Date: Sep 14, 2022  Office Visit     Dermatology Problem List:  1. Acne vulgaris, inflammatory  - Current tx:  Clindamycin 1% lotion, tretinoin 0.025% cream, BP wash  - Previous tx: Accutane (took for 1 month x2, stopped due to insurance issues; resumed 5/2022-7/2022 and discontinued due to dry skin; cumulative dose 70 mg/kg)    ____________________________________________    Assessment & Plan:    # Acne vulgaris, chronic, active.  Since last visit, discontinued isotretinoin due to dry skin. Some acne breakouts recently but better than prior to accutane). Discussed resuming accutane v restarting topicals; she prefers the latter, will let us know if flares to point she would like to resume.  - Start tretinoin 0.025% cream or Differin 0.1% cream nightly (pending which is better with insurance)  - Start clindamycin 1% lotion daily in AM  - Start OTC BP wash  - Future: consider resuming isotretinoin    Procedures Performed:   None    Follow-up: 6 month(s) in-person, or earlier for new or changing lesions    Staff and Scribe:     Scribe Disclosure:  I, Richard Licona, am serving as a scribe to document services personally performed by Debby Cutler MD based on data collection and the provider's statements to me.     Provider Disclosure:   The documentation recorded by the scribe accurately reflects the services I personally performed and the decisions made by me.    Debby Cutler MD    Department of Dermatology  [facility]: Phone: [phone],  Fax: [phone]  9/19/2022     ____________________________________________    CC: Derm Problem (Acne - stopped Accutane back in June due to dryness. Would like to discuss other treatment options. )    HPI:  Ms. Emiliana Helms is a(n) 21 year old female who presents today as a return patient for acne follow-up. Last seen by me on 6/22/22, at which time patient increased isotretinoin to 60 mg daily for treatment of acne vulgaris. Additionally, the patient was started on triamcinolone cream 0.025% BID for the treatment of perioral hyperpigmentation.    Today, patient reported that she discontinued Accutane due to dryness approximately 2 months ago. She reports that her oily skin has returned and has had a couple of break outs during the last week that were not around her period and not location specific on the face. Patient was hoping to get clindamycin for topical treatment and would want to wait at least one year if they were to start Accutane again.     Patient is otherwise feeling well, without additional skin concerns.    Labs Reviewed:  N/A    Physical Exam:  Vitals: There were no vitals taken for this visit.  SKIN: Focused examination of face was performed.  - One acneiform papule, left temple.  - Closed comedones on cheek obscured by makeup.  - No other lesions of concern on areas examined.     Medications:  Current Outpatient Medications   Medication     adapalene (DIFFERIN) 0.1 % external cream     buPROPion (WELLBUTRIN XL) 150 MG 24 hr tablet     clindamycin (CLEOCIN T) 1 % external lotion     tretinoin (RETIN-A) 0.025 % external cream     busPIRone (BUSPAR) 5 MG tablet     cetirizine (ZYRTEC) 10 MG tablet     ISOtretinoin (ABSORICA) 30 MG capsule     ISOtretinoin (ACCUTANE) 40 MG capsule     triamcinolone (KENALOG) 0.025 % cream     No current facility-administered medications for this visit.      Past Medical History:   Patient Active Problem List   Diagnosis     Other idiopathic scoliosis, thoracolumbar  region     Intermittent asthma, well controlled     Vitamin D deficiency     Major depression, single episode     Absence attack (H)     Exercise intolerance     Social anxiety disorder     Seasonal allergies     Past Medical History:   Diagnosis Date     Asthma, intermittent      Constipation 06/26/2011     Contact with or exposure to tuberculosis 12/2003    on INH ppd neg     Mild persistent asthma 08/20/2012     Other idiopathic scoliosis, thoracolumbar region 2014     Undiagnosed cardiac murmurs     tiny PFO     Vitamin D deficiency 2017    vitamin D 15        CC Referred Self, MD  No address on file on close of this encounter.      Again, thank you for allowing me to participate in the care of your patient.      Sincerely,    Debby Cutler MD

## 2022-09-14 NOTE — PATIENT INSTRUCTIONS
I sent tretinoin 0.025% cream to pharmacy and also adapalene 0.1% cream   Hopefully one will be covered  You can also check [employer] - tube is ~$45    Clindamycin also sent    Benzoyl wash over the counter

## 2022-11-21 ENCOUNTER — HEALTH MAINTENANCE LETTER (OUTPATIENT)
Age: 22
End: 2022-11-21

## 2023-02-06 ENCOUNTER — TELEPHONE (OUTPATIENT)
Dept: INTERNAL MEDICINE | Facility: CLINIC | Age: 23
End: 2023-02-06
Payer: COMMERCIAL

## 2023-02-06 NOTE — TELEPHONE ENCOUNTER
Per pharmacy    Plan does not cover this medication (buPROPion (WELLBUTRIN XL) 150 MG 24 hr tablet). Please call plan at [phone] to initiate prior auth or call/fax pharmacy to change med.      Pt ID is [id number]      miko

## 2023-02-07 NOTE — TELEPHONE ENCOUNTER
Triage RN tried to contact the patient in regards to the message received below. Upon chart review, last script for this medication was sent 4/5/2022 for 60 tablets with one additional refill on file. Script was sent to the pharmacy by Lisa Ponce.    Is patient still taking this medication? If so, patient should be requesting refills through her PCP (if she has one).     Patient Contact    Attempt # 1    Was call answered?  No.  Left message on voicemail with information to call me back.    Carmela Luna RN

## 2023-02-08 NOTE — TELEPHONE ENCOUNTER
Attempted to contact pt in regards to the message below. No answer. Left VM.     Upon callback- please ask pt if she is still taking the medication and if so, she should be requesting refills through her PCP (if she has one).       Patient Contact    Attempt # 2    Was call answered?  No.  Left message on voicemail with information to call me back.

## 2023-02-09 NOTE — TELEPHONE ENCOUNTER
Patient Contact    Attempt # 3    Was call answered?  No.  Left message on voicemail with information to call me back with assistance of Belgian . Sent [employer] message.     Multiple attempts made to reach patient with no response. Closing encounter    Ariadne Carson RN

## 2023-07-09 ENCOUNTER — HEALTH MAINTENANCE LETTER (OUTPATIENT)
Age: 23
End: 2023-07-09

## 2023-09-16 ENCOUNTER — OFFICE VISIT (OUTPATIENT)
Dept: URGENT CARE | Facility: URGENT CARE | Age: 23
End: 2023-09-16
Payer: COMMERCIAL

## 2023-09-16 VITALS
SYSTOLIC BLOOD PRESSURE: 115 MMHG | DIASTOLIC BLOOD PRESSURE: 77 MMHG | WEIGHT: 156 LBS | HEART RATE: 86 BPM | RESPIRATION RATE: 16 BRPM | BODY MASS INDEX: 25.96 KG/M2 | OXYGEN SATURATION: 98 % | TEMPERATURE: 98.1 F

## 2023-09-16 DIAGNOSIS — R59.9 LYMPH NODE ENLARGEMENT: Primary | ICD-10-CM

## 2023-09-16 PROCEDURE — 99213 OFFICE O/P EST LOW 20 MIN: CPT

## 2023-09-16 NOTE — PROGRESS NOTES
Assessment & Plan     Lymph node enlargement    Patient reassured that this is a reactive lymph node and is nowhere near where her thyroid is.   Taught patient how to palpate her own thyroid and counseled on when to return if thyroid had any enlargement or new masses noted.  Patient counseled that the reactive node should be self-limiting in the next 2-4 weeks with increased fluids and ibuprofen prn.  Patient denies ST and strep testing.  Advised close Follow-up if no change or new or worsening sx prn.    Liliam Escobar MD   Urgent Care Provider  [facility] URGENT CARE JONEL Hopson is a 22 year old, presenting for the following health issues:  Urgent Care (Lump on right side of her throat and its painful )      HPI     Patient presents with tender mass on RIGHT side of neck-- hurts to swallow.  No ST.  No fever or cough.  Otherwise feels well.    Mom has hx of thyroid cancer and wants to rule out that this is not a thyroid mass.    Review of Systems   Constitutional, HEENT, cardiovascular, pulmonary, GI, , musculoskeletal, neuro, skin, endocrine and psych systems are negative, except as otherwise noted.      Objective    /77   Pulse 86   Temp 98.1  F (36.7  C)   Resp 16   Wt 70.8 kg (156 lb)   SpO2 98%   BMI 25.96 kg/m    Body mass index is 25.96 kg/m .  Physical Exam   GENERAL: healthy, alert and no distress  EYES: Eyes grossly normal to inspection, PERRL and conjunctivae and sclerae normal  HENT: ear canals and TM's normal, nose and mouth without ulcers or lesions  NECK: reactive RIGHT cervical lymph node with very mild enlargement compared to LEFT side, mildly tender to palp-- no other enlarged nodes noted, no asymmetry, masses, or scars, and thyroid normal to palpation  PSYCH: mentation appears normal, affect normal/bright

## 2024-01-04 ENCOUNTER — MYC REFILL (OUTPATIENT)
Dept: DERMATOLOGY | Facility: CLINIC | Age: 24
End: 2024-01-04
Payer: COMMERCIAL

## 2024-01-04 DIAGNOSIS — L70.0 ACNE VULGARIS: ICD-10-CM

## 2024-01-04 RX ORDER — TRETINOIN 0.25 MG/G
CREAM TOPICAL
Qty: 45 G | Refills: 11 | Status: CANCELLED | OUTPATIENT
Start: 2024-01-04

## 2024-01-05 NOTE — TELEPHONE ENCOUNTER
tretinoin (RETIN-A) 0.025 % external cream        LCV: 9-14-22  Derm process 1  RF denied pt needs appt.

## 2024-01-09 RX ORDER — CLINDAMYCIN PHOSPHATE 10 UG/ML
LOTION TOPICAL DAILY
Qty: 60 ML | Refills: 11 | OUTPATIENT
Start: 2024-01-09

## 2024-01-09 RX ORDER — TRETINOIN 0.25 MG/G
CREAM TOPICAL
Qty: 45 G | Refills: 11 | OUTPATIENT
Start: 2024-01-09

## 2024-01-09 NOTE — TELEPHONE ENCOUNTER
tretinoin (RETIN-A) 0.025 % external cream   45 g 11 9/14/2022     Last Office Visit : 9-  Future Office visit:  5-      Topical Acne Medications Protocol [id number]/04/2024 07:28 PM   Protocol Details Recent (12 mo) or future (30 days) visit within the authorizing provider's specialty   Process 1    clindamycin (CLEOCIN T) 1 % external lotion   60 mL 11 9/14/2022   Last Office Visit : 9-  Future Office visit:  5-  Process 1

## 2024-02-10 ENCOUNTER — MYC REFILL (OUTPATIENT)
Dept: DERMATOLOGY | Facility: CLINIC | Age: 24
End: 2024-02-10
Payer: COMMERCIAL

## 2024-02-10 DIAGNOSIS — L70.0 ACNE VULGARIS: ICD-10-CM

## 2024-02-10 NOTE — TELEPHONE ENCOUNTER
9/14/2022 Assessment & Plan:     # Acne vulgaris, chronic, active.  Since last visit, discontinued isotretinoin due to dry skin. Some acne breakouts recently but better than prior to accutane). Discussed resuming accutane v restarting topicals; she prefers the latter, will let us know if flares to point she would like to resume.  - Start tretinoin 0.025% cream or Differin 0.1% cream nightly (pending which is better with insurance)  - Start clindamycin 1% lotion daily in AM  - Start OTC BP wash  - Future: consider resuming isotretinoin

## 2024-02-12 ENCOUNTER — TELEPHONE (OUTPATIENT)
Dept: DERMATOLOGY | Facility: CLINIC | Age: 24
End: 2024-02-12
Payer: COMMERCIAL

## 2024-02-12 RX ORDER — TRETINOIN 0.25 MG/G
CREAM TOPICAL
Qty: 45 G | Refills: 2 | Status: SHIPPED | OUTPATIENT
Start: 2024-02-12

## 2024-02-12 NOTE — TELEPHONE ENCOUNTER
[facility] Call Center    Phone Message    May a detailed message be left on voicemail: yes     Reason for Call: Medication Refill Request    Has the patient contacted the pharmacy for the refill? Yes   Name of medication being requested: tretinoin (RETIN-A) 0.025 %   Provider who prescribed the medication: Dr. Cutler  Pharmacy: [facility] #[id number] [address]  Date medication is needed: ASAP   Pt said this is an emergency refill and can not wait until May.   Thanks    Action Taken: Message routed to:  [facility] Derm    Travel Screening: Not Applicable

## 2024-05-10 ENCOUNTER — OFFICE VISIT (OUTPATIENT)
Dept: DERMATOLOGY | Facility: CLINIC | Age: 24
End: 2024-05-10
Payer: COMMERCIAL

## 2024-05-10 DIAGNOSIS — L70.0 ACNE VULGARIS: Primary | ICD-10-CM

## 2024-05-10 PROCEDURE — 99214 OFFICE O/P EST MOD 30 MIN: CPT | Performed by: DERMATOLOGY

## 2024-05-10 RX ORDER — SPIRONOLACTONE 50 MG/1
50 TABLET, FILM COATED ORAL DAILY
Qty: 90 TABLET | Refills: 3 | Status: SHIPPED | OUTPATIENT
Start: 2024-05-10

## 2024-05-10 RX ORDER — ADAPALENE 0.1 G/100G
CREAM TOPICAL
Qty: 45 G | Refills: 11 | Status: SHIPPED | OUTPATIENT
Start: 2024-05-10

## 2024-05-10 ASSESSMENT — PAIN SCALES - GENERAL: PAINLEVEL: NO PAIN (0)

## 2024-05-10 NOTE — LETTER
5/10/2024       RE: Emiliana Helms  [address] [facility] [address]     Dear Colleague,    Thank you for referring your patient, Emiliana Helms, to the [facility] DERMATOLOGY CLINIC [place] at [facility]. Please see a copy of my visit note below.    [facility] Dermatology Note  Encounter Date: May 10, 2024  Office Visit     Dermatology Problem List:  1. Acne vulgaris, inflammatory  - Current tx:  Clindamycin 1% lotion, BP wash, Spironolactone 50 mg daily started 5/10/24, Differin 0.1% cream  - Previous tx: Accutane (took for 1 month x2, stopped due to insurance issues; resumed 5/2022-7/2022 and discontinued due to dry skin; cumulative dose 70 mg/kg), retinoin 0.025% cream    ____________________________________________    Assessment & Plan:    # Acne vulgaris, chronic, active. Hormonal. Tretinoin too drying.  - start Spironolactone 50 mg daily - risks/benefits discussed.  - start adapalene 0.1% cream at bedtime  - can continue clindamycin 1% lotion daily in AM  - advised can use azelaic acid OTC as face wash    Procedures Performed:   None    Follow-up: 3 month(s) in-person, or earlier for new or changing lesions    Staff and Scribe:   Scribe Disclosure:   By signing my name below, I, Morena Lin, attest that this documentation has been prepared under the direction and in the presence of Debby Cutler MD.  - Electronically Signed: Morena Lin 05/10/24     Provider Disclosure:   The documentation recorded by the scribe accurately reflects the services I personally performed and the decisions made by me.    Debby Cutler MD    Department of Dermatology  [facility]: Phone: [phone], Fax: [phone]  5/16/2024         ____________________________________________    CC: Derm Problem (Patient reports acne has improved with use of  tretinoin. The patient would like to discuss alternative treatments due to drying affect of tretinoin. The patient would like discuss brittle texture of hair recently. )    HPI:  Ms. Emiliana Helms is a(n) 23 year old female who presents today as a return patient for acne follow-up. Last seen by me on 09/14/2022.    Patient reports she has been making efforts to avoid breakouts. For the past 1 year, she has noticed an increasing drying effect from the Tretinoin. She attempted to give time to adjust to it, but it causes her skin to be more textured. She reports isotretinoin in the past caused severe dry skin. She notes it flares with her menstrual cycles. She rates today's skin quality as much better. She notes she would get it mainly on her forehead or chin, but it has now moved to her cheeks. She uses Vanicream face wash, and will occasionally also add an ordinary cleanser.    She notes her hair is becoming more brittle.     Patient is otherwise feeling well, without additional skin concerns.    Labs Reviewed:  N/A    Physical exam:  Vitals: There were no vitals taken for this visit.  GEN: This is a well developed, well-nourished female in no acute distress, in a pleasant mood.    SKIN: Focused examination of the face was performed.  - hyperpigmented macules and acneiform/inflammatory papules on the face  - No other lesions of concern on areas examined.       Medications:  Current Outpatient Medications   Medication Sig Dispense Refill    adapalene (DIFFERIN) 0.1 % external cream Apply pea-sized amount to face at bedtime. Start every other night for 1-2 weeks, then increase to nightly as tolerated. 45 g 11    buPROPion (WELLBUTRIN XL) 150 MG 24 hr tablet Take 2 tablets (300 mg) by mouth daily 60 tablet 1    busPIRone (BUSPAR) 5 MG tablet Take 1 tablet (5 mg) by mouth 2 times daily 60 tablet 3    cetirizine (ZYRTEC) 10 MG tablet Take 1 tablet (10 mg) by mouth daily 90 tablet 3    clindamycin (CLEOCIN T) 1 % external  lotion Apply topically daily In morning 60 mL 11    ISOtretinoin (ABSORICA) 30 MG capsule Take 1 capsule (30 mg) by mouth 2 times daily 60 capsule 0    ISOtretinoin (ACCUTANE) 40 MG capsule Take 1 capsule (40 mg) by mouth daily 30 capsule 0    tretinoin (RETIN-A) 0.025 % external cream Apply pea-sized amount to face at bedtime. Start every other night for 1-2 weeks, then increase to nightly as tolerated. Please keep derm appt as scheduled. 45 g 2    triamcinolone (KENALOG) 0.025 % cream Apply topically 2 times daily To face as needed 80 g 0     No current facility-administered medications for this visit.      Past Medical History:   Patient Active Problem List   Diagnosis    Other idiopathic scoliosis, thoracolumbar region    Intermittent asthma, well controlled    Vitamin D deficiency    Major depression, single episode    Absence attack (H)    Exercise intolerance    Social anxiety disorder    Seasonal allergies     Past Medical History:   Diagnosis Date    Asthma, intermittent     Constipation 06/26/2011    Contact with or exposure to tuberculosis 12/2003    on INH ppd neg    Mild persistent asthma 08/20/2012    Other idiopathic scoliosis, thoracolumbar region 2014    Undiagnosed cardiac murmurs     tiny PFO    Vitamin D deficiency 2017    vitamin D 15        CC Referred Self,

## 2024-05-10 NOTE — NURSING NOTE
Chief Complaint   Patient presents with    Derm Problem     Patient reports acne has improved with use of tretinoin. The patient would like to discuss alternative treatments due to drying affect of tretinoin. The patient would like discuss brittle texture of hair recently.      Lisbet Donato LPN

## 2024-05-10 NOTE — PROGRESS NOTES
[facility] Dermatology Note  Encounter Date: May 10, 2024  Office Visit     Dermatology Problem List:  1. Acne vulgaris, inflammatory  - Current tx:  Clindamycin 1% lotion, BP wash, Spironolactone 50 mg daily started 5/10/24, Differin 0.1% cream  - Previous tx: Accutane (took for 1 month x2, stopped due to insurance issues; resumed 5/2022-7/2022 and discontinued due to dry skin; cumulative dose 70 mg/kg), retinoin 0.025% cream    ____________________________________________    Assessment & Plan:    # Acne vulgaris, chronic, active. Hormonal. Tretinoin too drying.  - start Spironolactone 50 mg daily - risks/benefits discussed.  - start adapalene 0.1% cream at bedtime  - can continue clindamycin 1% lotion daily in AM  - advised can use azelaic acid OTC as face wash    Procedures Performed:   None    Follow-up: 3 month(s) in-person, or earlier for new or changing lesions    Staff and Scribe:   Scribe Disclosure:   By signing my name below, I, Morena Riley, attest that this documentation has been prepared under the direction and in the presence of Debby Cutler MD.  - Electronically Signed: Morena Lin 05/10/24     Provider Disclosure:   The documentation recorded by the scribe accurately reflects the services I personally performed and the decisions made by me.    Debby Cutler MD    Department of Dermatology  [facility]: Phone: [phone], Fax: [phone]  5/16/2024         ____________________________________________    CC: Derm Problem (Patient reports acne has improved with use of tretinoin. The patient would like to discuss alternative treatments due to drying affect of tretinoin. The patient would like discuss brittle texture of hair recently. )    HPI:  Ms. Emiliana Helms is a(n) 23 year old female who presents today as a return patient for acne follow-up. Last seen by me on  09/14/2022.    Patient reports she has been making efforts to avoid breakouts. For the past 1 year, she has noticed an increasing drying effect from the Tretinoin. She attempted to give time to adjust to it, but it causes her skin to be more textured. She reports isotretinoin in the past caused severe dry skin. She notes it flares with her menstrual cycles. She rates today's skin quality as much better. She notes she would get it mainly on her forehead or chin, but it has now moved to her cheeks. She uses Vanicream face wash, and will occasionally also add an ordinary cleanser.    She notes her hair is becoming more brittle.     Patient is otherwise feeling well, without additional skin concerns.    Labs Reviewed:  N/A    Physical exam:  Vitals: There were no vitals taken for this visit.  GEN: This is a well developed, well-nourished female in no acute distress, in a pleasant mood.    SKIN: Focused examination of the face was performed.  - hyperpigmented macules and acneiform/inflammatory papules on the face  - No other lesions of concern on areas examined.       Medications:  Current Outpatient Medications   Medication Sig Dispense Refill    adapalene (DIFFERIN) 0.1 % external cream Apply pea-sized amount to face at bedtime. Start every other night for 1-2 weeks, then increase to nightly as tolerated. 45 g 11    buPROPion (WELLBUTRIN XL) 150 MG 24 hr tablet Take 2 tablets (300 mg) by mouth daily 60 tablet 1    busPIRone (BUSPAR) 5 MG tablet Take 1 tablet (5 mg) by mouth 2 times daily 60 tablet 3    cetirizine (ZYRTEC) 10 MG tablet Take 1 tablet (10 mg) by mouth daily 90 tablet 3    clindamycin (CLEOCIN T) 1 % external lotion Apply topically daily In morning 60 mL 11    ISOtretinoin (ABSORICA) 30 MG capsule Take 1 capsule (30 mg) by mouth 2 times daily 60 capsule 0    ISOtretinoin (ACCUTANE) 40 MG capsule Take 1 capsule (40 mg) by mouth daily 30 capsule 0    tretinoin (RETIN-A) 0.025 % external cream Apply pea-sized  amount to face at bedtime. Start every other night for 1-2 weeks, then increase to nightly as tolerated. Please keep derm appt as scheduled. 45 g 2    triamcinolone (KENALOG) 0.025 % cream Apply topically 2 times daily To face as needed 80 g 0     No current facility-administered medications for this visit.      Past Medical History:   Patient Active Problem List   Diagnosis    Other idiopathic scoliosis, thoracolumbar region    Intermittent asthma, well controlled    Vitamin D deficiency    Major depression, single episode    Absence attack (H)    Exercise intolerance    Social anxiety disorder    Seasonal allergies     Past Medical History:   Diagnosis Date    Asthma, intermittent     Constipation 06/26/2011    Contact with or exposure to tuberculosis 12/2003    on INH ppd neg    Mild persistent asthma 08/20/2012    Other idiopathic scoliosis, thoracolumbar region 2014    Undiagnosed cardiac murmurs     tiny PFO    Vitamin D deficiency 2017    vitamin D 15        CC Referred Self, MD  No address on file on close of this encounter.

## 2024-05-16 ENCOUNTER — TELEPHONE (OUTPATIENT)
Dept: DERMATOLOGY | Facility: CLINIC | Age: 24
End: 2024-05-16
Payer: COMMERCIAL

## 2024-05-16 NOTE — TELEPHONE ENCOUNTER
Prior Authorization Retail Medication Request    Medication/Dose: adapalene (DIFFERIN) 0.1 % external cream  Diagnosis and ICD code (if different than what is on RX):  L70.0  New/renewal/insurance change PA/secondary ins. PA:  Previously Tried and Failed:  See Chart      Insurance   Primary: RAO RESENDEZ   Insurance ID:  [id number]     Key: [id number]

## 2024-05-29 NOTE — TELEPHONE ENCOUNTER
Central Prior Authorization Team  Phone: [phone]    PA Initiation    Medication: ADAPALENE 0.1 % EX CREA  Insurance Company: [employer] - Phone [phone] Fax [phone]  Pharmacy Filling the Rx: [id number] [address] [place] [address]  Filling Pharmacy Phone: [phone]  Filling Pharmacy Fax:    Start Date: 5/29/2024

## 2024-05-30 NOTE — TELEPHONE ENCOUNTER
Central Prior Authorization Team  Phone: [phone]    Prior Authorization Approval    Medication: ADAPALENE 0.1 % EX CREA  Authorization Effective Date: 5/30/2024  Authorization Expiration Date: 5/30/2025  Approved Dose/Quantity:   Reference #:     Insurance Company: Jac - Phone [phone] Fax [phone]  Expected CoPay: $    CoPay Card Available:      Financial Assistance Needed:   Which Pharmacy is filling the prescription: [facility] #[id number] [address]  Pharmacy Notified: YES  Patient Notified: PHARMACY WILL NOTIFY PT WHEN READY

## 2024-07-04 ENCOUNTER — MYC MEDICAL ADVICE (OUTPATIENT)
Dept: DERMATOLOGY | Facility: CLINIC | Age: 24
End: 2024-07-04
Payer: COMMERCIAL

## 2024-07-04 DIAGNOSIS — L70.0 ACNE VULGARIS: ICD-10-CM

## 2024-07-05 NOTE — TELEPHONE ENCOUNTER
5/10/24 Office Visit   Assessment & Plan:     # Acne vulgaris, chronic, active. Hormonal. Tretinoin too drying.  - start Spironolactone 50 mg daily - risks/benefits discussed.  - start adapalene 0.1% cream at bedtime  - can continue clindamycin 1% lotion daily in AM  - advised can use azelaic acid OTC as face wash

## 2024-07-08 RX ORDER — TRETINOIN 0.5 MG/G
CREAM TOPICAL
Qty: 45 G | Refills: 11 | Status: SHIPPED | OUTPATIENT
Start: 2024-07-08

## 2024-08-31 ENCOUNTER — HEALTH MAINTENANCE LETTER (OUTPATIENT)
Age: 24
End: 2024-08-31

## 2024-09-01 ENCOUNTER — HOSPITAL ENCOUNTER (EMERGENCY)
Facility: CLINIC | Age: 24
Discharge: HOME | End: 2024-09-01
Attending: STUDENT IN AN ORGANIZED HEALTH CARE EDUCATION/TRAINING PROGRAM | Admitting: STUDENT IN AN ORGANIZED HEALTH CARE EDUCATION/TRAINING PROGRAM
Payer: COMMERCIAL

## 2024-09-01 VITALS
TEMPERATURE: 98.3 F | HEART RATE: 87 BPM | OXYGEN SATURATION: 100 % | DIASTOLIC BLOOD PRESSURE: 57 MMHG | WEIGHT: 169 LBS | BODY MASS INDEX: 28.12 KG/M2 | SYSTOLIC BLOOD PRESSURE: 104 MMHG | RESPIRATION RATE: 16 BRPM

## 2024-09-01 DIAGNOSIS — J45.20 MILD INTERMITTENT ASTHMA, UNSPECIFIED WHETHER COMPLICATED: ICD-10-CM

## 2024-09-01 LAB
ANION GAP SERPL CALCULATED.3IONS-SCNC: 12 MMOL/L (ref 7–15)
BASOPHILS # BLD AUTO: 0 10E3/UL (ref 0–0.2)
BASOPHILS NFR BLD AUTO: 1 %
BUN SERPL-MCNC: 14.5 MG/DL (ref 6–20)
CALCIUM SERPL-MCNC: 8.5 MG/DL (ref 8.8–10.4)
CHLORIDE SERPL-SCNC: 103 MMOL/L (ref 98–107)
CREAT SERPL-MCNC: 0.56 MG/DL (ref 0.51–0.95)
EGFRCR SERPLBLD CKD-EPI 2021: >90 ML/MIN/1.73M2
EOSINOPHIL # BLD AUTO: 0.3 10E3/UL (ref 0–0.7)
EOSINOPHIL NFR BLD AUTO: 6 %
ERYTHROCYTE [DISTWIDTH] IN BLOOD BY AUTOMATED COUNT: 13.2 % (ref 10–15)
GLUCOSE SERPL-MCNC: 89 MG/DL (ref 70–99)
HCO3 SERPL-SCNC: 21 MMOL/L (ref 22–29)
HCT VFR BLD AUTO: 39.4 % (ref 35–47)
HGB BLD-MCNC: 12.8 G/DL (ref 11.7–15.7)
IMM GRANULOCYTES # BLD: 0 10E3/UL
IMM GRANULOCYTES NFR BLD: 0 %
LYMPHOCYTES # BLD AUTO: 2 10E3/UL (ref 0.8–5.3)
LYMPHOCYTES NFR BLD AUTO: 41 %
MCH RBC QN AUTO: 29.1 PG (ref 26.5–33)
MCHC RBC AUTO-ENTMCNC: 32.5 G/DL (ref 31.5–36.5)
MCV RBC AUTO: 90 FL (ref 78–100)
MONOCYTES # BLD AUTO: 0.4 10E3/UL (ref 0–1.3)
MONOCYTES NFR BLD AUTO: 9 %
NEUTROPHILS # BLD AUTO: 2.1 10E3/UL (ref 1.6–8.3)
NEUTROPHILS NFR BLD AUTO: 44 %
NRBC # BLD AUTO: 0 10E3/UL
NRBC BLD AUTO-RTO: 0 /100
PLATELET # BLD AUTO: 259 10E3/UL (ref 150–450)
POTASSIUM SERPL-SCNC: 4.3 MMOL/L (ref 3.4–5.3)
RBC # BLD AUTO: 4.4 10E6/UL (ref 3.8–5.2)
SARS-COV-2 RNA RESP QL NAA+PROBE: NEGATIVE
SODIUM SERPL-SCNC: 136 MMOL/L (ref 135–145)
WBC # BLD AUTO: 4.9 10E3/UL (ref 4–11)

## 2024-09-01 PROCEDURE — 85025 COMPLETE CBC W/AUTO DIFF WBC: CPT | Performed by: EMERGENCY MEDICINE

## 2024-09-01 PROCEDURE — 99285 EMERGENCY DEPT VISIT HI MDM: CPT | Mod: 25

## 2024-09-01 PROCEDURE — 85025 COMPLETE CBC W/AUTO DIFF WBC: CPT | Performed by: STUDENT IN AN ORGANIZED HEALTH CARE EDUCATION/TRAINING PROGRAM

## 2024-09-01 PROCEDURE — 93005 ELECTROCARDIOGRAM TRACING: CPT

## 2024-09-01 PROCEDURE — 80048 BASIC METABOLIC PNL TOTAL CA: CPT | Performed by: EMERGENCY MEDICINE

## 2024-09-01 PROCEDURE — 94640 AIRWAY INHALATION TREATMENT: CPT

## 2024-09-01 PROCEDURE — 80048 BASIC METABOLIC PNL TOTAL CA: CPT | Performed by: STUDENT IN AN ORGANIZED HEALTH CARE EDUCATION/TRAINING PROGRAM

## 2024-09-01 PROCEDURE — 36415 COLL VENOUS BLD VENIPUNCTURE: CPT | Performed by: EMERGENCY MEDICINE

## 2024-09-01 PROCEDURE — 87635 SARS-COV-2 COVID-19 AMP PRB: CPT | Performed by: STUDENT IN AN ORGANIZED HEALTH CARE EDUCATION/TRAINING PROGRAM

## 2024-09-01 PROCEDURE — 250N000009 HC RX 250: Performed by: STUDENT IN AN ORGANIZED HEALTH CARE EDUCATION/TRAINING PROGRAM

## 2024-09-01 RX ORDER — INHALER, ASSIST DEVICES
SPACER (EA) MISCELLANEOUS
Qty: 1 EACH | Refills: 0 | Status: SHIPPED | OUTPATIENT
Start: 2024-09-01

## 2024-09-01 RX ORDER — BUDESONIDE AND FORMOTEROL FUMARATE DIHYDRATE 80; 4.5 UG/1; UG/1
2 AEROSOL RESPIRATORY (INHALATION) 2 TIMES DAILY
Qty: 6.9 G | Refills: 0 | Status: SHIPPED | OUTPATIENT
Start: 2024-09-01

## 2024-09-01 RX ORDER — IPRATROPIUM BROMIDE AND ALBUTEROL SULFATE 2.5; .5 MG/3ML; MG/3ML
3 SOLUTION RESPIRATORY (INHALATION)
Status: COMPLETED | OUTPATIENT
Start: 2024-09-01 | End: 2024-09-01

## 2024-09-01 RX ADMIN — IPRATROPIUM BROMIDE AND ALBUTEROL SULFATE 3 ML: .5; 3 SOLUTION RESPIRATORY (INHALATION) at 22:34

## 2024-09-01 RX ADMIN — IPRATROPIUM BROMIDE AND ALBUTEROL SULFATE 3 ML: .5; 3 SOLUTION RESPIRATORY (INHALATION) at 22:19

## 2024-09-01 RX ADMIN — IPRATROPIUM BROMIDE AND ALBUTEROL SULFATE 3 ML: .5; 3 SOLUTION RESPIRATORY (INHALATION) at 22:01

## 2024-09-01 ASSESSMENT — COLUMBIA-SUICIDE SEVERITY RATING SCALE - C-SSRS
6. HAVE YOU EVER DONE ANYTHING, STARTED TO DO ANYTHING, OR PREPARED TO DO ANYTHING TO END YOUR LIFE?: NO
1. IN THE PAST MONTH, HAVE YOU WISHED YOU WERE DEAD OR WISHED YOU COULD GO TO SLEEP AND NOT WAKE UP?: NO
2. HAVE YOU ACTUALLY HAD ANY THOUGHTS OF KILLING YOURSELF IN THE PAST MONTH?: NO

## 2024-09-01 ASSESSMENT — ACTIVITIES OF DAILY LIVING (ADL): ADLS_ACUITY_SCORE: 35

## 2024-09-02 LAB
ATRIAL RATE - MUSE: 72 BPM
DIASTOLIC BLOOD PRESSURE - MUSE: NORMAL MMHG
INTERPRETATION ECG - MUSE: NORMAL
P AXIS - MUSE: 80 DEGREES
PR INTERVAL - MUSE: 142 MS
QRS DURATION - MUSE: 92 MS
QT - MUSE: 388 MS
QTC - MUSE: 424 MS
R AXIS - MUSE: 67 DEGREES
SYSTOLIC BLOOD PRESSURE - MUSE: NORMAL MMHG
T AXIS - MUSE: 51 DEGREES
VENTRICULAR RATE- MUSE: 72 BPM

## 2024-09-02 NOTE — DISCHARGE INSTRUCTIONS
Discharge Instructions  Asthma    Asthma is a condition causing narrowing and inflammation of the airways that can make it hard to breathe.  Asthma can also cause cough, wheezing, noisy breathing and tightness in the chest.  Asthma can be brought on or  triggered  by many things, including dust, mold, pollen, cigarette smoke, exercise, stress and infections (like the common cold).     Generally, every Emergency Department visit should have a follow-up clinic visit with either a primary or a specialty clinic/provider. Please follow-up as instructed by your emergency provider today.    Return to the Emergency Department if:  Your breathing gets worse.  You need to use your inhaler more often than every 4 hours, or cannot get relief from your inhaler.  You are very weak, or feel much more ill.  You develop new symptoms, such as chest pain.  You cough up blood.  You are vomiting (throwing up) enough that you cannot keep fluids or your medicine down.    What can I do to help myself?  Fill any prescriptions the provider gave you and take them right away--especially antibiotics. Be sure to finish the whole antibiotic prescription.  You may be given a prescription for an inhaler, which can help loosen tight air passages.  Use this as needed, but not more often than directed. Inhalers work much better when used with a spacer.   You may be given a prescription for a steroid to reduce inflammation. Used long-term, these can have some side effects, but for short-term use they are safe. You may notice restlessness or increased appetite (eating more).      You may use non-prescription cough or cold medicines. Cough medicines may help, but do not make the cough go away completely.   Avoid smoke, because this can make you feel worse. If you smoke, this may be a good time to quit! Consider using nicotine lozenges, gum, or patches to reduce cravings.   If you have a fever, Tylenol  (acetaminophen), Motrin  (ibuprofen), or Advil   (ibuprofen), may help bring fever down and may help you feel more comfortable. Be sure to read and follow the package directions, and ask your provider if you have questions.  Be sure to get your flu shot each year.  For certain age groups, the pneumonia shot can help prevent pneumonia.  It is important that you follow up with your regular provider, to be sure that you are improving from this spell (an acute asthma exacerbation), and also to do what you can to keep from having trouble again. Sometimes you need long-term medicines to keep your asthma under control.   If you were given a prescription for medicine here today, be sure to read all of the information (including the package insert) that comes with your prescription.  This will include important information about the medicine, its side effects, and any warnings that you need to know about.  The pharmacist who fills the prescription can provide more information and answer questions you may have about the medicine.  If you have questions or concerns that the pharmacist cannot address, please call or return to the Emergency Department.   Remember that you can always come back to the Emergency Department if you are not able to see your regular provider in the amount of time listed above, if you get any new symptoms, or if there is anything that worries you.  Return to the emergency department if symptoms are worsening, become concerning, or for any other concerns. Follow-up with your doctor in 2-3 and sooner if needed.

## 2024-09-02 NOTE — ED TRIAGE NOTES
Pt c/o cough with SOB for 1 month. Pt states hx asthma      Triage Assessment (Adult)       Row Name 09/01/24 2100          Triage Assessment    Airway WDL WDL        Respiratory WDL    Respiratory WDL WDL        Skin Circulation/Temperature WDL    Skin Circulation/Temperature WDL WDL        Cardiac WDL    Cardiac WDL WDL        Peripheral/Neurovascular WDL    Peripheral Neurovascular WDL WDL        Cognitive/Neuro/Behavioral WDL    Cognitive/Neuro/Behavioral WDL WDL

## 2024-09-02 NOTE — ED PROVIDER NOTES
Emergency Department Note      History of Present Illness     Chief Complaint   Shortness of Breath and Cough      HPI   Emiliana Helms is a 23 year old female with a history of asthma who presents to the ED for an evaluation of shortness of breath and cough. The patient reports that she been having shortness of breath and productive cough for the past month. Notes that her shortness of breath worsens when she is laying down at night.  States that she been having productive cough for the past month but noticed some specs of blood in her phlegm today. No clots of blood. Adds that she been feeling feverish. Denies any leg swelling. No tobacco use and alcohol use. No recent surgery.  No history of DVT or PE.  No hormone use.    Independent Historian   Patient, as per HPI.       Review of External Notes   none    Past Medical History     Medical History and Problem List   Asthma   Constipation   Idiopathic scoliosis   Undiagnosed cardiac murmurs   Contact with or exposure to tuberculosis      Medications   Wellbutrin XL   Zyrtec   Absorica   Accutane   Aldactone       Physical Exam     Patient Vitals for the past 24 hrs:   BP Temp Temp src Pulse Resp SpO2 Weight   09/01/24 2059 128/62 98.3  F (36.8  C) Oral 68 16 98 % 76.7 kg (169 lb)     Physical Exam  GENERAL: Patient well-appearing  HEAD: Atraumatic.  NECK: No rigidity  CV: RRR, no murmurs, rubs or gallops  PULM: CTAB with good aeration; no retractions, rales, rhonchi, or wheezing  ABD: Soft, nontender, nondistended, no guarding  DERM: No rash. Skin warm and dry  EXTREMITY: Moving all extremities without difficulty. No calf tenderness or peripheral edema  VASCULAR: Symmetric pulses bilaterally      Diagnostics     Lab Results   Labs Ordered and Resulted from Time of ED Arrival to Time of ED Departure   BASIC METABOLIC PANEL - Abnormal       Result Value    Sodium 136      Potassium 4.3      Chloride 103      Carbon Dioxide (CO2) 21 (*)     Anion Gap 12      Urea  Nitrogen 14.5      Creatinine 0.56      GFR Estimate >90      Calcium 8.5 (*)     Glucose 89     COVID-19 VIRUS (CORONAVIRUS) BY PCR - Normal    SARS CoV2 PCR Negative     CBC WITH PLATELETS AND DIFFERENTIAL    WBC Count 4.9      RBC Count 4.40      Hemoglobin 12.8      Hematocrit 39.4      MCV 90      MCH 29.1      MCHC 32.5      RDW 13.2      Platelet Count 259      % Neutrophils 44      % Lymphocytes 41      % Monocytes 9      % Eosinophils 6      % Basophils 1      % Immature Granulocytes 0      NRBCs per 100 WBC 0      Absolute Neutrophils 2.1      Absolute Lymphocytes 2.0      Absolute Monocytes 0.4      Absolute Eosinophils 0.3      Absolute Basophils 0.0      Absolute Immature Granulocytes 0.0      Absolute NRBCs 0.0         Imaging   No orders to display       EKG   ECG taken at 2103, ECG read at 2116  Sinus rhythm with sinus arrhythmia   Possible left atrial enlargement   Rate 72 bpm. LA interval 142 ms. QRS duration 92 ms. QT/QTc 388/424 ms. P-R-T axes 80 67 51.    Independent Interpretation   None    ED Course      Medications Administered   Medications   ipratropium - albuterol 0.5 mg/2.5 mg/3 mL (DUONEB) neb solution 3 mL (3 mLs Nebulization $Given 9/1/24 2235)       Procedures   Procedures     Discussion of Management   None    ED Course        Additional Documentation  None    Medical Decision Making / Diagnosis       NOBLE Helms is a 23 year old female     Symptoms consistent with mild asthma exacerbation.     Chronic conditions complicating - asthma    DDx: pneumonia, PE, pneumothorax however evaluation not consistent with these etiologies.    Labs are drawn from triage and do not require acute intervention.    Vital signs are reassuring.  She has no tachypnea.  She is speaking clearly.  She has no wheezing.  Her lung sounds are clear.  However she subjectively felt tightness therefore given DuoNeb.  Afterward she felt much better.  With the mild nature of this, do not think she requires  steroids.    In regards to the tiny specks of blood with coughing.  She has been coughing for a month now which I suspect has irritated her airways.  She is low risk by Wells.  Do not think she requires workup for PE.    Did prescribe patient budesonide/formoterol and spacer for home.    I have evaluated the patient for acute medical emergencies and have clinically decided no further acute medical interventions are required. Reassessed multiple times and improving. Patient stable for discharge. All questions answered. Given strict return precautions. Patient content with plan. The differential diagnosis and treatment modalities were discussed thoroughly with the parent. Recommended PCP follow-up in 2-3 days.      Disposition   The patient was discharged.     Diagnosis     ICD-10-CM    1. Mild intermittent asthma, unspecified whether complicated  J45.20            Discharge Medications   New Prescriptions    BUDESONIDE-FORMOTEROL (SYMBICORT) 80-4.5 MCG/ACT INHALER    Inhale 2 puffs into the lungs 2 times daily.    SPACER (OPTICHAMBER VINAY) HOLDING CHAMBER    1         Scribe Disclosure:  I, Taylor Carpenter, am serving as a scribe at 10:09 PM on 9/1/2024 to document services personally performed by Benito Cerda MD based on my observations and the provider's statements to me.        Benito Cerda MD  09/01/24 4229

## 2024-09-03 ENCOUNTER — ANCILLARY PROCEDURE (OUTPATIENT)
Dept: GENERAL RADIOLOGY | Facility: CLINIC | Age: 24
End: 2024-09-03
Attending: PHYSICIAN ASSISTANT
Payer: COMMERCIAL

## 2024-09-03 ENCOUNTER — OFFICE VISIT (OUTPATIENT)
Dept: URGENT CARE | Facility: URGENT CARE | Age: 24
End: 2024-09-03
Payer: COMMERCIAL

## 2024-09-03 VITALS
RESPIRATION RATE: 16 BRPM | DIASTOLIC BLOOD PRESSURE: 62 MMHG | SYSTOLIC BLOOD PRESSURE: 114 MMHG | HEART RATE: 89 BPM | WEIGHT: 170 LBS | BODY MASS INDEX: 28.29 KG/M2 | OXYGEN SATURATION: 100 % | TEMPERATURE: 98.2 F

## 2024-09-03 DIAGNOSIS — J45.21 MILD INTERMITTENT ASTHMA WITH EXACERBATION: Primary | ICD-10-CM

## 2024-09-03 DIAGNOSIS — R05.3 PERSISTENT COUGH: ICD-10-CM

## 2024-09-03 DIAGNOSIS — J30.9 ALLERGIC RHINITIS, UNSPECIFIED SEASONALITY, UNSPECIFIED TRIGGER: ICD-10-CM

## 2024-09-03 PROCEDURE — 71046 X-RAY EXAM CHEST 2 VIEWS: CPT | Mod: TC | Performed by: RADIOLOGY

## 2024-09-03 PROCEDURE — 99214 OFFICE O/P EST MOD 30 MIN: CPT | Performed by: PHYSICIAN ASSISTANT

## 2024-09-03 RX ORDER — ALBUTEROL SULFATE 90 UG/1
2 AEROSOL, METERED RESPIRATORY (INHALATION) EVERY 6 HOURS PRN
Qty: 18 G | Refills: 0 | Status: SHIPPED | OUTPATIENT
Start: 2024-09-03

## 2024-09-03 RX ORDER — METHYLPREDNISOLONE 4 MG
TABLET, DOSE PACK ORAL
Qty: 21 TABLET | Refills: 0 | Status: SHIPPED | OUTPATIENT
Start: 2024-09-03

## 2024-09-03 RX ORDER — CETIRIZINE HYDROCHLORIDE 10 MG/1
10 TABLET ORAL EVERY EVENING
Qty: 30 TABLET | Refills: 0 | Status: SHIPPED | OUTPATIENT
Start: 2024-09-03

## 2024-09-03 NOTE — PROGRESS NOTES
Patient presents with:  Cough: Onset: 1 month: Pt has SOB, chest pain, had cough for a month. Pt states symptoms are worst at night.         At (J45.21) Mild intermittent asthma with exacerbation  (primary encounter diagnosis)  Comment:   Plan: methylPREDNISolone (MEDROL DOSEPAK) 4 MG tablet        therapy pack, albuterol (PROAIR HFA/PROVENTIL         HFA/VENTOLIN HFA) 108 (90 Base) MCG/ACT inhaler            (R05.3) Persistent cough  Comment:   Plan: XR Chest 2 Views            (J30.9) Allergic rhinitis, unspecified seasonality, unspecified trigger  Comment:   Plan: cetirizine (ZYRTEC) 10 MG tablet            Stay on cetirizine nightly until the ground freezes    Follow up with primary clinic should symptoms persist or worsen.      the end of the encounter, I discussed results, diagnosis, medications. Discussed red flags for immediate return to clinic/ER, as well as indications for follow up if no improvement. Patient understood and agreed to plan. Patient was stable for discharge       SUBJECTIVE:   Emiliana Helms is a 23 year old female who presents today with persistent cough and wheezing.  Was seen in the ER a couple of days ago.  Was given a Symbicort inhaler with little relief.  Patient does have a history of asthma, but has not had a flare for many years.    She does complain of some runny nose and some sneezing.  Denies any fever  EKG at the ER on 9/1/2024 was within normal limits no chest x-ray was done.    Patient Active Problem List   Diagnosis    Other idiopathic scoliosis, thoracolumbar region    Intermittent asthma, well controlled    Vitamin D deficiency    Major depression, single episode    Absence attack (H)    Exercise intolerance    Social anxiety disorder    Seasonal allergies         Past Medical History:   Diagnosis Date    Asthma, intermittent     Constipation 06/26/2011    Contact with or exposure to tuberculosis 12/2003    on INH ppd neg    Mild persistent asthma 08/20/2012    Other  idiopathic scoliosis, thoracolumbar region 2014    Undiagnosed cardiac murmurs     tiny PFO    Vitamin D deficiency 2017    vitamin D 15         Current Outpatient Medications   Medication Sig Dispense Refill    Multiple Vitamins-Iron (DAILY-MARLEN/IRON/BETA-CAROTENE) TABS TAKE 1 TABLET BY MOUTH DAILY. (Patient not taking: Reported on 10/19/2020) 30 tablet 7     Social History     Tobacco Use    Smoking status: Never Smoker    Smokeless tobacco: Never Used   Substance Use Topics    Alcohol use: Not on file     Family History   Problem Relation Age of Onset    Diabetes Mother     Diabetes Father          ROS:    10 point ROS of systems including Constitutional, Eyes, Respiratory, Cardiovascular, Gastroenterology, Genitourinary, Integumentary, Muscularskeletal, Psychiatric ,neurological were all negative except for pertinent positives noted in my HPI       OBJECTIVE:  /62 (BP Location: Left arm, Patient Position: Sitting, Cuff Size: Adult Regular)   Pulse 89   Temp 98.2  F (36.8  C) (Tympanic)   Resp 16   Wt 77.1 kg (170 lb)   SpO2 100%   BMI 28.29 kg/m    Physical Exam:  GENERAL APPEARANCE: healthy, alert and no distress  EYES: EOMI,  PERRL, conjunctiva clear  HENT: ear canals and TM's normal.  Nose with boggy turbinates and clear coryza and mouth without ulcers, erythema or lesions  NECK: supple, nontender, no lymphadenopathy  RESP: lungs clear to auscultation - no rales, rhonchi or wheezes  CV: regular rates and rhythm, normal S1 S2, no murmur noted  ABDOMEN:  soft, nontender, no HSM or masses and bowel sounds normal  SKIN: no suspicious lesions or rashes    X-Ray was done, my findings are: No infiltrates or masses or pneumothorax

## 2024-09-03 NOTE — PATIENT INSTRUCTIONS
(J45.21) Mild intermittent asthma with exacerbation  (primary encounter diagnosis)  Comment:   Plan: methylPREDNISolone (MEDROL DOSEPAK) 4 MG tablet        therapy pack, albuterol (PROAIR HFA/PROVENTIL         HFA/VENTOLIN HFA) 108 (90 Base) MCG/ACT inhaler            (R05.3) Persistent cough  Comment:   Plan: XR Chest 2 Views            (J30.9) Allergic rhinitis, unspecified seasonality, unspecified trigger  Comment:   Plan: cetirizine (ZYRTEC) 10 MG tablet            Stay on cetirizine nightly until the ground freezes    Follow up with primary clinic should symptoms persist or worsen.

## 2024-09-06 ENCOUNTER — OFFICE VISIT (OUTPATIENT)
Dept: DERMATOLOGY | Facility: CLINIC | Age: 24
End: 2024-09-06
Payer: COMMERCIAL

## 2024-09-06 DIAGNOSIS — L70.0 ACNE VULGARIS: ICD-10-CM

## 2024-09-06 PROCEDURE — 99214 OFFICE O/P EST MOD 30 MIN: CPT | Performed by: DERMATOLOGY

## 2024-09-06 ASSESSMENT — PAIN SCALES - GENERAL: PAINLEVEL: NO PAIN (0)

## 2024-09-06 NOTE — LETTER
9/6/2024       RE: Emiliana Helms  [address] [facility] [address]     Dear Colleague,    Thank you for referring your patient, Emiliana Helms, to the [facility] [place] at [facility]. Please see a copy of my visit note below.    [facility] Dermatology Note  Encounter Date: Sep 6, 2024  Office Visit     Dermatology Problem List:  1. Acne vulgaris, inflammatory  - Current tx:  Clindamycin 1% lotion, tretinoin 0.05% cream  - Previous tx: spironolactone (started 5/2024 stopped in 6/2024, did not tolerate due to weight gain), Accutane (took for 1 month x2, stopped due to insurance issues; resumed 5/2022-7/2022 and discontinued due to dry skin; cumulative dose 70 mg/kg), retinoin 0.025% cream, Differin 0.1% cream, BP wash (too irritating)    ____________________________________________    Assessment & Plan:    # Acne vulgaris, chronic, active. Hormonal.  - spironolactone helped but did not tolerate (weight gain)  - discussed options a bit limited, could consider Winlevi or compounded topical spironolactone v isotretinoin v cosmetic derm referral for peels etc; not interested in repeat isotretinoin; she will plan to continue topicals which have helped  - resume clindamycin lotion BID prn  - continue tretinoin 0.05% cream, can let me know if would like 0.1%  - advised can use azelaic acid OTC as face wash, BP wash too irritating  - continue vanicream gentle cleanser  - monitor diet, see if triggers noted; reducing dairy has been helpful    Procedures Performed:   None    Follow-up: 3-4 month(s) in-person, or earlier for new or changing lesions    Staff:    Debby Cutler MD    Department of Dermatology  [facility]: Phone: [phone], Fax: [phone]  9/6/2024    ____________________________________________    CC: Derm  Problem (3 month acne follow-up; reports spironolactone was working for her face but she started gaining weight so she discontinued it. She is using retinol now, reports that it is drying to her skin and she still is having breakout and texture.)    HPI:  Ms. Emiliana Helms is a(n) 23 year old female who presents today as a return patient for acne follow-up.     Started spironolactone and it helped, but then gained weight so stopped in about June  Stopped clindamycin  Tretinoin 0.05% cream is helping a little but not as good as it did before accutane    Bp wash too irritating  Using vanicream gentle cleanser    Cut out dairy recently might have helped      Patient is otherwise feeling well, without additional skin concerns.    Labs Reviewed:  N/A    Physical exam:  Vitals: There were no vitals taken for this visit.  GEN: This is a well developed, well-nourished female in no acute distress, in a pleasant mood.    SKIN: Focused examination of the face was performed.  - hyperpigmented macules and acneiform/inflammatory papules on the face  - No other lesions of concern on areas examined.       Medications:  Current Outpatient Medications   Medication Sig Dispense Refill     albuterol (PROAIR HFA/PROVENTIL HFA/VENTOLIN HFA) 108 (90 Base) MCG/ACT inhaler Inhale 2 puffs into the lungs every 6 hours as needed for shortness of breath, wheezing or cough. 18 g 0     budesonide-formoterol (SYMBICORT) 80-4.5 MCG/ACT Inhaler Inhale 2 puffs into the lungs 2 times daily. 6.9 g 0     cetirizine (ZYRTEC) 10 MG tablet Take 1 tablet (10 mg) by mouth every evening. 30 tablet 0     methylPREDNISolone (MEDROL DOSEPAK) 4 MG tablet therapy pack Follow Package Directions 21 tablet 0     spacer (OPTICHAMBER VINAY) holding chamber 1 1 each 0     tretinoin (RETIN-A) 0.05 % external cream Apply pea-sized amount to face at bedtime. Start every other night for 1-2 weeks, then increase to nightly as tolerated. Please keep derm appt as scheduled.  45 g 11     adapalene (DIFFERIN) 0.1 % external cream Apply pea-sized amount to face at bedtime. Start every other night for 1-2 weeks, then increase to nightly as tolerated. (Patient not taking: Reported on 9/6/2024) 45 g 11     buPROPion (WELLBUTRIN XL) 150 MG 24 hr tablet Take 2 tablets (300 mg) by mouth daily (Patient not taking: Reported on 9/6/2024) 60 tablet 1     busPIRone (BUSPAR) 5 MG tablet Take 1 tablet (5 mg) by mouth 2 times daily (Patient not taking: Reported on 9/6/2024) 60 tablet 3     cetirizine (ZYRTEC) 10 MG tablet Take 1 tablet (10 mg) by mouth daily (Patient not taking: Reported on 9/6/2024) 90 tablet 3     clindamycin (CLEOCIN T) 1 % external lotion Apply topically daily In morning (Patient not taking: Reported on 9/6/2024) 60 mL 11     ISOtretinoin (ABSORICA) 30 MG capsule Take 1 capsule (30 mg) by mouth 2 times daily (Patient not taking: Reported on 9/6/2024) 60 capsule 0     ISOtretinoin (ACCUTANE) 40 MG capsule Take 1 capsule (40 mg) by mouth daily (Patient not taking: Reported on 9/6/2024) 30 capsule 0     spironolactone (ALDACTONE) 50 MG tablet Take 1 tablet (50 mg) by mouth daily (Patient not taking: Reported on 9/6/2024) 90 tablet 3     tretinoin (RETIN-A) 0.025 % external cream Apply pea-sized amount to face at bedtime. Start every other night for 1-2 weeks, then increase to nightly as tolerated. Please keep derm appt as scheduled. (Patient not taking: Reported on 9/6/2024) 45 g 2     triamcinolone (KENALOG) 0.025 % cream Apply topically 2 times daily To face as needed (Patient not taking: Reported on 9/6/2024) 80 g 0     No current facility-administered medications for this visit.      Past Medical History:   Patient Active Problem List   Diagnosis     Other idiopathic scoliosis, thoracolumbar region     Intermittent asthma, well controlled     Vitamin D deficiency     Major depression, single episode     Absence attack (H)     Exercise intolerance     Social anxiety disorder      Seasonal allergies     Past Medical History:   Diagnosis Date     Asthma, intermittent      Constipation 06/26/2011     Contact with or exposure to tuberculosis 12/2003    on INH ppd neg     Mild persistent asthma 08/20/2012     Other idiopathic scoliosis, thoracolumbar region 2014     Undiagnosed cardiac murmurs     tiny PFO     Vitamin D deficiency 2017    vitamin D 15        CC Referred Self, MD  No address on file on close of this encounter.      Again, thank you for allowing me to participate in the care of your patient.      Sincerely,    Debby Cutler MD

## 2024-09-06 NOTE — NURSING NOTE
Chief Complaint   Patient presents with    Derm Problem     3 month acne follow-up; reports spironolactone was working for her face but she started gaining weight so she discontinued it. She is using retinol now, reports that it is drying to her skin and she still is having breakout and texture.     Oly MAZA RN  Dermatology Surgery  [phone]

## 2024-09-06 NOTE — PROGRESS NOTES
[facility] Dermatology Note  Encounter Date: Sep 6, 2024  Office Visit     Dermatology Problem List:  1. Acne vulgaris, inflammatory  - Current tx:  Clindamycin 1% lotion, tretinoin 0.05% cream  - Previous tx: spironolactone (started 5/2024 stopped in 6/2024, did not tolerate due to weight gain), Accutane (took for 1 month x2, stopped due to insurance issues; resumed 5/2022-7/2022 and discontinued due to dry skin; cumulative dose 70 mg/kg), retinoin 0.025% cream, Differin 0.1% cream, BP wash (too irritating)    ____________________________________________    Assessment & Plan:    # Acne vulgaris, chronic, active. Hormonal.  - spironolactone helped but did not tolerate (weight gain)  - discussed options a bit limited, could consider Winlevi or compounded topical spironolactone v isotretinoin v cosmetic derm referral for peels etc; not interested in repeat isotretinoin; she will plan to continue topicals which have helped  - resume clindamycin lotion BID prn  - continue tretinoin 0.05% cream, can let me know if would like 0.1%  - advised can use azelaic acid OTC as face wash, BP wash too irritating  - continue vanicream gentle cleanser  - monitor diet, see if triggers noted; reducing dairy has been helpful    Procedures Performed:   None    Follow-up: 3-4 month(s) in-person, or earlier for new or changing lesions    Staff:    Debby Cutler MD    Department of Dermatology  [facility]: Phone: [phone], Fax: [phone]  9/6/2024    ____________________________________________    CC: Derm Problem (3 month acne follow-up; reports spironolactone was working for her face but she started gaining weight so she discontinued it. She is using retinol now, reports that it is drying to her skin and she still is having breakout and texture.)    HPI:  Ms. Emiliana MERLY Helms is a(n) 23 year old female who presents  today as a return patient for acne follow-up.     Started spironolactone and it helped, but then gained weight so stopped in about June  Stopped clindamycin  Tretinoin 0.05% cream is helping a little but not as good as it did before accutane    Bp wash too irritating  Using vanicream gentle cleanser    Cut out dairy recently might have helped      Patient is otherwise feeling well, without additional skin concerns.    Labs Reviewed:  N/A    Physical exam:  Vitals: There were no vitals taken for this visit.  GEN: This is a well developed, well-nourished female in no acute distress, in a pleasant mood.    SKIN: Focused examination of the face was performed.  - hyperpigmented macules and acneiform/inflammatory papules on the face  - No other lesions of concern on areas examined.       Medications:  Current Outpatient Medications   Medication Sig Dispense Refill    albuterol (PROAIR HFA/PROVENTIL HFA/VENTOLIN HFA) 108 (90 Base) MCG/ACT inhaler Inhale 2 puffs into the lungs every 6 hours as needed for shortness of breath, wheezing or cough. 18 g 0    budesonide-formoterol (SYMBICORT) 80-4.5 MCG/ACT Inhaler Inhale 2 puffs into the lungs 2 times daily. 6.9 g 0    cetirizine (ZYRTEC) 10 MG tablet Take 1 tablet (10 mg) by mouth every evening. 30 tablet 0    methylPREDNISolone (MEDROL DOSEPAK) 4 MG tablet therapy pack Follow Package Directions 21 tablet 0    spacer (OPTICHAMBER VNIAY) holding chamber 1 1 each 0    tretinoin (RETIN-A) 0.05 % external cream Apply pea-sized amount to face at bedtime. Start every other night for 1-2 weeks, then increase to nightly as tolerated. Please keep derm appt as scheduled. 45 g 11    adapalene (DIFFERIN) 0.1 % external cream Apply pea-sized amount to face at bedtime. Start every other night for 1-2 weeks, then increase to nightly as tolerated. (Patient not taking: Reported on 9/6/2024) 45 g 11    buPROPion (WELLBUTRIN XL) 150 MG 24 hr tablet Take 2 tablets (300 mg) by mouth daily  (Patient not taking: Reported on 9/6/2024) 60 tablet 1    busPIRone (BUSPAR) 5 MG tablet Take 1 tablet (5 mg) by mouth 2 times daily (Patient not taking: Reported on 9/6/2024) 60 tablet 3    cetirizine (ZYRTEC) 10 MG tablet Take 1 tablet (10 mg) by mouth daily (Patient not taking: Reported on 9/6/2024) 90 tablet 3    clindamycin (CLEOCIN T) 1 % external lotion Apply topically daily In morning (Patient not taking: Reported on 9/6/2024) 60 mL 11    ISOtretinoin (ABSORICA) 30 MG capsule Take 1 capsule (30 mg) by mouth 2 times daily (Patient not taking: Reported on 9/6/2024) 60 capsule 0    ISOtretinoin (ACCUTANE) 40 MG capsule Take 1 capsule (40 mg) by mouth daily (Patient not taking: Reported on 9/6/2024) 30 capsule 0    spironolactone (ALDACTONE) 50 MG tablet Take 1 tablet (50 mg) by mouth daily (Patient not taking: Reported on 9/6/2024) 90 tablet 3    tretinoin (RETIN-A) 0.025 % external cream Apply pea-sized amount to face at bedtime. Start every other night for 1-2 weeks, then increase to nightly as tolerated. Please keep derm appt as scheduled. (Patient not taking: Reported on 9/6/2024) 45 g 2    triamcinolone (KENALOG) 0.025 % cream Apply topically 2 times daily To face as needed (Patient not taking: Reported on 9/6/2024) 80 g 0     No current facility-administered medications for this visit.      Past Medical History:   Patient Active Problem List   Diagnosis    Other idiopathic scoliosis, thoracolumbar region    Intermittent asthma, well controlled    Vitamin D deficiency    Major depression, single episode    Absence attack (H)    Exercise intolerance    Social anxiety disorder    Seasonal allergies     Past Medical History:   Diagnosis Date    Asthma, intermittent     Constipation 06/26/2011    Contact with or exposure to tuberculosis 12/2003    on INH ppd neg    Mild persistent asthma 08/20/2012    Other idiopathic scoliosis, thoracolumbar region 2014    Undiagnosed cardiac murmurs     tiny PFO    Vitamin D  deficiency 2017    vitamin D 15        CC Referred Self, MD  No address on file on close of this encounter.

## 2024-09-10 ENCOUNTER — ANCILLARY PROCEDURE (OUTPATIENT)
Dept: GENERAL RADIOLOGY | Facility: CLINIC | Age: 24
End: 2024-09-10
Attending: EMERGENCY MEDICINE
Payer: COMMERCIAL

## 2024-09-10 ENCOUNTER — OFFICE VISIT (OUTPATIENT)
Dept: URGENT CARE | Facility: URGENT CARE | Age: 24
End: 2024-09-10
Payer: COMMERCIAL

## 2024-09-10 VITALS
OXYGEN SATURATION: 100 % | TEMPERATURE: 97.5 F | WEIGHT: 166.2 LBS | BODY MASS INDEX: 27.66 KG/M2 | DIASTOLIC BLOOD PRESSURE: 74 MMHG | SYSTOLIC BLOOD PRESSURE: 118 MMHG | RESPIRATION RATE: 18 BRPM | HEART RATE: 65 BPM

## 2024-09-10 DIAGNOSIS — S29.011A MUSCLE STRAIN OF CHEST WALL, INITIAL ENCOUNTER: ICD-10-CM

## 2024-09-10 DIAGNOSIS — R07.81 RIB PAIN ON RIGHT SIDE: Primary | ICD-10-CM

## 2024-09-10 DIAGNOSIS — R07.81 RIB PAIN ON RIGHT SIDE: ICD-10-CM

## 2024-09-10 PROCEDURE — 71101 X-RAY EXAM UNILAT RIBS/CHEST: CPT | Mod: TC | Performed by: STUDENT IN AN ORGANIZED HEALTH CARE EDUCATION/TRAINING PROGRAM

## 2024-09-10 PROCEDURE — 99214 OFFICE O/P EST MOD 30 MIN: CPT | Performed by: EMERGENCY MEDICINE

## 2024-09-10 RX ORDER — BENZONATATE 100 MG/1
100 CAPSULE ORAL 3 TIMES DAILY PRN
Qty: 25 CAPSULE | Refills: 0 | Status: SHIPPED | OUTPATIENT
Start: 2024-09-10

## 2024-09-10 RX ORDER — LIDOCAINE 4 G/G
1 PATCH TOPICAL EVERY 24 HOURS
Qty: 10 PATCH | Refills: 0 | Status: SHIPPED | OUTPATIENT
Start: 2024-09-10

## 2024-09-10 RX ORDER — CLINDAMYCIN PHOSPHATE 10 UG/ML
LOTION TOPICAL DAILY
Qty: 60 ML | Refills: 11 | Status: SHIPPED | OUTPATIENT
Start: 2024-09-10

## 2024-09-10 NOTE — PROGRESS NOTES
Assessment & Plan     Diagnosis:    ICD-10-CM    1. Rib pain on right side  R07.81 XR Ribs & Chest Right G/E 3 Views     Lidocaine (LIDOCARE) 4 % Patch     benzonatate (TESSALON) 100 MG capsule      2. Muscle strain of chest wall, initial encounter  S29.011A Lidocaine (LIDOCARE) 4 % Patch     benzonatate (TESSALON) 100 MG capsule        Medical Decision Making:  Emiliana Helms is a 23 year old female presents for evaluation after coughing hard and feeling a muscle .  Signs and symptoms are consistent with a chest wall contusion.  A broad differential was considered including pneumothorax, rib fracture, hemothorax, sprain, strain, other fracture, nerve impingement/compromise, referred pain.    CXR is negative for pneumothorax, rib fracture, infiltrate on my read. I suspect a chest wall muscle strain vs occult rib fracture given her point tenderness and pain with twisting/bending. Radiology notes as per below.  the sensitivity for rib fracture on chest xray was discussed with patient and if symptoms continue or progress may need CT of chest; I do not feel with the patients risk/benefit ratio and clinical symptoms this is required at this time.      Supportive outpatient management is indicated.  She has not tried any pain medication I discussed importance of good pain control, rest, ice. Close follow-up with patient's primary care physician per discharge precautions. Chest wall contusion discharge instructions given for home.  Patient verbalizes understanding and agreement with the plan including reasons to go to the ER. All questions answered.     MUSTAPHA Singh [facility]    Subjective     Emiliana Helms is a 23 year old female who presents to clinic today for the following health issues:  Chief Complaint   Patient presents with    Urgent Care     Bad cough, lasting one month. Coughed hard last night and heard pop on right side. Very intense sharp pain, now has trouble breathing or using core  muscles.        HPI    Patient for the last month's had a cough, seen recently 6 days ago and had a chest x-ray which was normal, was told that this is likely bronchitis and given some treatment options.  Since that she is still had some coughing issues, last night coughed so hard she felt a strain in the right side of her chest and since then has had a very tender spot over her rib.  She notes it is painful to breathe in deeply, cough or bend/twist.  She does not feel short of breath however.  No lightheadedness, dizziness, abdominal pain, nausea, vomiting.  Did not try anything for pain control.       Review of Systems    See HPI    Objective      Vitals: /74   Pulse 65   Temp 97.5  F (36.4  C) (Tympanic)   Resp 18   Wt 75.4 kg (166 lb 3.2 oz)   LMP 09/09/2024 (Approximate)   SpO2 100%   Breastfeeding No   BMI 27.66 kg/m    Patient Vitals for the past 24 hrs:   BP Temp Temp src Pulse Resp SpO2 Weight   09/10/24 1112 118/74 97.5  F (36.4  C) Tympanic 65 18 100 % 75.4 kg (166 lb 3.2 oz)       Vital signs reviewed by: Dontrell Haile PA-C    Physical Exam   Constitutional: Patient is alert and cooperative. Mild acute distress.  Cardiovascular: Regular rate and rhythm  Pulmonary/Chest: point tenderness to the right lower/lateral ribs. No crepitus or flail chest. Lungs are clear to auscultation throughout. Effort normal. No respiratory distress. No wheezes, rales or rhonchi.  GI: Abdomen is soft and non-tender throughout. No hepatosplenomegaly. Negative Gomez's sign.   Neurological: Alert and oriented x3.   Skin: No rash noted on visualized skin.  Psychiatric:The patient has a normal mood and affect.     Labs/Imaging:  Results for orders placed or performed in visit on 09/10/24   XR Ribs & Chest Right G/E 3 Views     Status: None    Narrative    XR RIBS & CHEST RT 3VW   9/10/2024 12:02 PM     HISTORY: Rib pain on right side  COMPARISON: None.       Impression    IMPRESSION: Normal cardiomediastinal  silhouette. The lungs are clear.  No acute rib fracture identified.    NOLAN COOK MD         SYSTEM ID:  [id number]     Reading per radiology      Dontrell Haile PA-C, September 10, 2024

## 2024-10-31 ENCOUNTER — HOSPITAL ENCOUNTER (EMERGENCY)
Facility: CLINIC | Age: 24
Discharge: HOME | End: 2024-10-31
Attending: STUDENT IN AN ORGANIZED HEALTH CARE EDUCATION/TRAINING PROGRAM | Admitting: STUDENT IN AN ORGANIZED HEALTH CARE EDUCATION/TRAINING PROGRAM

## 2024-10-31 VITALS
TEMPERATURE: 98 F | DIASTOLIC BLOOD PRESSURE: 80 MMHG | SYSTOLIC BLOOD PRESSURE: 141 MMHG | OXYGEN SATURATION: 100 % | HEART RATE: 87 BPM | RESPIRATION RATE: 18 BRPM
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
## 2024-10-31 DIAGNOSIS — M54.50 ACUTE RIGHT-SIDED LOW BACK PAIN WITHOUT SCIATICA: ICD-10-CM

## 2024-10-31 PROCEDURE — 99284 EMERGENCY DEPT VISIT MOD MDM: CPT | Performed by: STUDENT IN AN ORGANIZED HEALTH CARE EDUCATION/TRAINING PROGRAM

## 2024-10-31 PROCEDURE — 99282 EMERGENCY DEPT VISIT SF MDM: CPT | Performed by: STUDENT IN AN ORGANIZED HEALTH CARE EDUCATION/TRAINING PROGRAM

## 2024-10-31 ASSESSMENT — COLUMBIA-SUICIDE SEVERITY RATING SCALE - C-SSRS
2. HAVE YOU ACTUALLY HAD ANY THOUGHTS OF KILLING YOURSELF IN THE PAST MONTH?: NO
1. IN THE PAST MONTH, HAVE YOU WISHED YOU WERE DEAD OR WISHED YOU COULD GO TO SLEEP AND NOT WAKE UP?: NO
6. HAVE YOU EVER DONE ANYTHING, STARTED TO DO ANYTHING, OR PREPARED TO DO ANYTHING TO END YOUR LIFE?: NO
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
## 2024-11-01 NOTE — DISCHARGE INSTRUCTIONS
Please return to the emergency department if you develop any new or worsening symptoms including but not limited to numbness, tingling, weakness, loss of bowel or bladder, pain, vomiting  Please follow-up with your primary care doctor as needed

## 2024-11-14 ENCOUNTER — HOSPITAL ENCOUNTER (EMERGENCY)
Facility: CLINIC | Age: 24
Discharge: HOME | End: 2024-11-14
Attending: EMERGENCY MEDICINE
Payer: COMMERCIAL

## 2024-11-14 ENCOUNTER — APPOINTMENT (OUTPATIENT)
Dept: GENERAL RADIOLOGY | Facility: CLINIC | Age: 24
End: 2024-11-14
Attending: EMERGENCY MEDICINE
Payer: COMMERCIAL

## 2024-11-14 VITALS
OXYGEN SATURATION: 100 % | SYSTOLIC BLOOD PRESSURE: 126 MMHG | DIASTOLIC BLOOD PRESSURE: 71 MMHG | TEMPERATURE: 97.8 F | RESPIRATION RATE: 18 BRPM | HEART RATE: 77 BPM

## 2024-11-14 DIAGNOSIS — R55 SYNCOPE AND COLLAPSE: ICD-10-CM

## 2024-11-14 LAB
ALBUMIN SERPL BCG-MCNC: 4.2 G/DL (ref 3.5–5.2)
ALP SERPL-CCNC: ABNORMAL U/L
ALT SERPL W P-5'-P-CCNC: ABNORMAL U/L
ANION GAP SERPL CALCULATED.3IONS-SCNC: 12 MMOL/L (ref 7–15)
AST SERPL W P-5'-P-CCNC: ABNORMAL U/L
BASOPHILS # BLD AUTO: 0 10E3/UL (ref 0–0.2)
BASOPHILS NFR BLD AUTO: 1 %
BILIRUB SERPL-MCNC: 0.5 MG/DL
BUN SERPL-MCNC: 11.7 MG/DL (ref 6–20)
CALCIUM SERPL-MCNC: 9.3 MG/DL (ref 8.8–10.4)
CHLORIDE SERPL-SCNC: 101 MMOL/L (ref 98–107)
CREAT SERPL-MCNC: 0.5 MG/DL (ref 0.51–0.95)
D DIMER PPP FEU-MCNC: 0.56 UG/ML FEU (ref 0–0.5)
EGFRCR SERPLBLD CKD-EPI 2021: >90 ML/MIN/1.73M2
EOSINOPHIL # BLD AUTO: 0.1 10E3/UL (ref 0–0.7)
EOSINOPHIL NFR BLD AUTO: 1 %
ERYTHROCYTE [DISTWIDTH] IN BLOOD BY AUTOMATED COUNT: 13.1 % (ref 10–15)
GLUCOSE SERPL-MCNC: 94 MG/DL (ref 70–99)
HCG SERPL QL: NEGATIVE
HCO3 SERPL-SCNC: 22 MMOL/L (ref 22–29)
HCT VFR BLD AUTO: 39.3 % (ref 35–47)
HGB BLD-MCNC: 12.7 G/DL (ref 11.7–15.7)
IMM GRANULOCYTES # BLD: 0 10E3/UL
IMM GRANULOCYTES NFR BLD: 0 %
LYMPHOCYTES # BLD AUTO: 2.1 10E3/UL (ref 0.8–5.3)
LYMPHOCYTES NFR BLD AUTO: 47 %
MAGNESIUM SERPL-MCNC: 1.7 MG/DL (ref 1.7–2.3)
MCH RBC QN AUTO: 28 PG (ref 26.5–33)
MCHC RBC AUTO-ENTMCNC: 32.3 G/DL (ref 31.5–36.5)
MCV RBC AUTO: 87 FL (ref 78–100)
MONOCYTES # BLD AUTO: 0.2 10E3/UL (ref 0–1.3)
MONOCYTES NFR BLD AUTO: 5 %
NEUTROPHILS # BLD AUTO: 2.1 10E3/UL (ref 1.6–8.3)
NEUTROPHILS NFR BLD AUTO: 46 %
NRBC # BLD AUTO: 0 10E3/UL
NRBC BLD AUTO-RTO: 0 /100
PLATELET # BLD AUTO: 266 10E3/UL (ref 150–450)
POTASSIUM SERPL-SCNC: 5.2 MMOL/L (ref 3.4–5.3)
PROT SERPL-MCNC: 7.3 G/DL (ref 6.4–8.3)
RBC # BLD AUTO: 4.53 10E6/UL (ref 3.8–5.2)
SODIUM SERPL-SCNC: 135 MMOL/L (ref 135–145)
WBC # BLD AUTO: 4.6 10E3/UL (ref 4–11)

## 2024-11-14 PROCEDURE — 99285 EMERGENCY DEPT VISIT HI MDM: CPT | Mod: 25 | Performed by: EMERGENCY MEDICINE

## 2024-11-14 PROCEDURE — 36415 COLL VENOUS BLD VENIPUNCTURE: CPT | Performed by: EMERGENCY MEDICINE

## 2024-11-14 PROCEDURE — 85379 FIBRIN DEGRADATION QUANT: CPT | Performed by: EMERGENCY MEDICINE

## 2024-11-14 PROCEDURE — 85014 HEMATOCRIT: CPT | Performed by: EMERGENCY MEDICINE

## 2024-11-14 PROCEDURE — 93308 TTE F-UP OR LMTD: CPT | Performed by: EMERGENCY MEDICINE

## 2024-11-14 PROCEDURE — 84155 ASSAY OF PROTEIN SERUM: CPT | Performed by: EMERGENCY MEDICINE

## 2024-11-14 PROCEDURE — 84703 CHORIONIC GONADOTROPIN ASSAY: CPT | Performed by: EMERGENCY MEDICINE

## 2024-11-14 PROCEDURE — 83735 ASSAY OF MAGNESIUM: CPT | Performed by: EMERGENCY MEDICINE

## 2024-11-14 PROCEDURE — 85004 AUTOMATED DIFF WBC COUNT: CPT | Performed by: EMERGENCY MEDICINE

## 2024-11-14 PROCEDURE — 82040 ASSAY OF SERUM ALBUMIN: CPT | Performed by: EMERGENCY MEDICINE

## 2024-11-14 PROCEDURE — 93005 ELECTROCARDIOGRAM TRACING: CPT | Mod: 59 | Performed by: EMERGENCY MEDICINE

## 2024-11-14 PROCEDURE — 71046 X-RAY EXAM CHEST 2 VIEWS: CPT | Mod: 26 | Performed by: RADIOLOGY

## 2024-11-14 PROCEDURE — 93308 TTE F-UP OR LMTD: CPT | Mod: 26 | Performed by: EMERGENCY MEDICINE

## 2024-11-14 PROCEDURE — 71046 X-RAY EXAM CHEST 2 VIEWS: CPT

## 2024-11-14 PROCEDURE — 93010 ELECTROCARDIOGRAM REPORT: CPT | Mod: 59 | Performed by: EMERGENCY MEDICINE

## 2024-11-14 ASSESSMENT — COLUMBIA-SUICIDE SEVERITY RATING SCALE - C-SSRS
2. HAVE YOU ACTUALLY HAD ANY THOUGHTS OF KILLING YOURSELF IN THE PAST MONTH?: NO
6. HAVE YOU EVER DONE ANYTHING, STARTED TO DO ANYTHING, OR PREPARED TO DO ANYTHING TO END YOUR LIFE?: NO
1. IN THE PAST MONTH, HAVE YOU WISHED YOU WERE DEAD OR WISHED YOU COULD GO TO SLEEP AND NOT WAKE UP?: NO

## 2024-11-14 ASSESSMENT — ACTIVITIES OF DAILY LIVING (ADL)
ADLS_ACUITY_SCORE: 0

## 2024-11-15 NOTE — DISCHARGE INSTRUCTIONS
You had a reassuring evaluation in the ED today. If you develop significantly worsening symptoms or another episode of losing consciousness, return to the emergency department for further evaluation.
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
## 2024-11-16 LAB
ATRIAL RATE - MUSE: 74 BPM
DIASTOLIC BLOOD PRESSURE - MUSE: NORMAL MMHG
INTERPRETATION ECG - MUSE: NORMAL
P AXIS - MUSE: 74 DEGREES
PR INTERVAL - MUSE: 136 MS
QRS DURATION - MUSE: 92 MS
QT - MUSE: 384 MS
QTC - MUSE: 426 MS
R AXIS - MUSE: 60 DEGREES
SYSTOLIC BLOOD PRESSURE - MUSE: NORMAL MMHG
T AXIS - MUSE: 46 DEGREES
VENTRICULAR RATE- MUSE: 74 BPM

## 2024-11-20 ENCOUNTER — MYC REFILL (OUTPATIENT)
Dept: DERMATOLOGY | Facility: CLINIC | Age: 24
End: 2024-11-20
Payer: COMMERCIAL

## 2024-11-20 DIAGNOSIS — L70.0 ACNE VULGARIS: ICD-10-CM

## 2024-11-21 RX ORDER — ISOTRETINOIN 30 MG/1
30 CAPSULE ORAL 2 TIMES DAILY
Qty: 60 CAPSULE | Refills: 0 | OUTPATIENT
Start: 2024-11-21

## 2025-05-03 ENCOUNTER — MYC REFILL (OUTPATIENT)
Dept: DERMATOLOGY | Facility: CLINIC | Age: 25
End: 2025-05-03
Payer: MEDICAID

## 2025-05-03 DIAGNOSIS — L70.0 ACNE VULGARIS: ICD-10-CM

## 2025-05-05 ENCOUNTER — MYC REFILL (OUTPATIENT)
Dept: DERMATOLOGY | Facility: CLINIC | Age: 25
End: 2025-05-05
Payer: MEDICAID

## 2025-05-05 DIAGNOSIS — L70.0 ACNE VULGARIS: ICD-10-CM

## 2025-05-05 RX ORDER — TRETINOIN 0.5 MG/G
CREAM TOPICAL
Qty: 45 G | Refills: 3 | Status: SHIPPED | OUTPATIENT
Start: 2025-05-05

## 2025-05-05 RX ORDER — TRETINOIN 0.25 MG/G
CREAM TOPICAL
Qty: 45 G | Refills: 2 | Status: CANCELLED | OUTPATIENT
Start: 2025-05-05

## 2025-05-05 NOTE — TELEPHONE ENCOUNTER
Last Visit: 9/6/2024 [facility] [place]    tretinoin (RETIN-A) 0.025 % external cream : passed Derm protocol   - Refilled qty to 12 months from last visit (process #1/Derm protocol).      Request from pharmacy:  Requested Prescriptions   Pending Prescriptions Disp Refills    tretinoin (RETIN-A) 0.05 % external cream 45 g 11     Sig: Apply pea-sized amount to face at bedtime. Start every other night for 1-2 weeks, then increase to nightly as tolerated. Please keep derm appt as scheduled.       Topical Acne Medications Protocol Passed - 5/5/2025  6:33 PM        Passed - Patient is 12 years of age or older        Passed - Medication is active on med list and the sig matches. RN to manually verify dose and sig if red X/fail.     If the protocol passes (green check), you do not need to verify med dose and sig.    A prescription matches if they are the same clinical intention.    For Example: once daily and every morning are the same.    The protocol can not identify upper and lower case letters as matching and will fail.     For Example: Take 1 tablet (50 mg) by mouth daily     TAKE 1 TABLET (50 MG) BY MOUTH DAILY    For all fails (red x), verify dose and sig.    If the refill does match what is on file, the RN can still proceed to approve the refill request.       If they do not match, route to the appropriate provider.             Passed - Recent (12 mo) or future (90 days) visit within the authorizing provider's specialty     The patient must have completed an in-person or virtual visit within the past 12 months or has a future visit scheduled within the next 90 days with the authorizing provider s specialty.  Urgent care and e-visits do not qualify as an office visit for this protocol.

## 2025-05-06 NOTE — TELEPHONE ENCOUNTER
tretinoin (RETIN-A) 0.025 % external cream : addressed in other encounter  - refills sent 5/5/2025  - message sent to patient

## 2025-06-18 ENCOUNTER — HOSPITAL ENCOUNTER (OUTPATIENT)
Dept: ULTRASOUND IMAGING | Facility: HOSPITAL | Age: 25
Discharge: HOME | End: 2025-06-18
Attending: PHYSICIAN ASSISTANT

## 2025-06-18 DIAGNOSIS — N93.9 ABNORMAL UTERINE BLEEDING (AUB): ICD-10-CM

## 2025-06-18 PROCEDURE — 76377 3D RENDER W/INTRP POSTPROCES: CPT

## 2025-08-03 ENCOUNTER — OFFICE VISIT (OUTPATIENT)
Dept: URGENT CARE | Facility: URGENT CARE | Age: 25
End: 2025-08-03
Payer: MEDICAID

## 2025-08-03 VITALS
DIASTOLIC BLOOD PRESSURE: 69 MMHG | HEART RATE: 79 BPM | SYSTOLIC BLOOD PRESSURE: 107 MMHG | TEMPERATURE: 98.2 F | RESPIRATION RATE: 16 BRPM | OXYGEN SATURATION: 100 %

## 2025-08-03 DIAGNOSIS — N39.0 URINARY TRACT INFECTION WITHOUT HEMATURIA, SITE UNSPECIFIED: Primary | ICD-10-CM

## 2025-08-03 LAB
ALBUMIN UR-MCNC: ABNORMAL MG/DL
APPEARANCE UR: CLEAR
BACTERIA #/AREA URNS HPF: ABNORMAL /HPF
BILIRUB UR QL STRIP: NEGATIVE
COLOR UR AUTO: YELLOW
GLUCOSE UR STRIP-MCNC: NEGATIVE MG/DL
HGB UR QL STRIP: ABNORMAL
KETONES UR STRIP-MCNC: ABNORMAL MG/DL
LEUKOCYTE ESTERASE UR QL STRIP: ABNORMAL
NITRATE UR QL: NEGATIVE
PH UR STRIP: 6 [PH] (ref 5–8)
RBC #/AREA URNS AUTO: ABNORMAL /HPF
SP GR UR STRIP: 1.02 (ref 1–1.03)
SQUAMOUS #/AREA URNS AUTO: ABNORMAL /LPF
UROBILINOGEN UR STRIP-ACNC: 0.2 E.U./DL
WBC #/AREA URNS AUTO: >100 /HPF

## 2025-08-03 PROCEDURE — 3074F SYST BP LT 130 MM HG: CPT | Performed by: FAMILY MEDICINE

## 2025-08-03 PROCEDURE — 81001 URINALYSIS AUTO W/SCOPE: CPT | Performed by: FAMILY MEDICINE

## 2025-08-03 PROCEDURE — 99213 OFFICE O/P EST LOW 20 MIN: CPT | Performed by: FAMILY MEDICINE

## 2025-08-03 PROCEDURE — 87086 URINE CULTURE/COLONY COUNT: CPT | Performed by: FAMILY MEDICINE

## 2025-08-03 PROCEDURE — 87186 SC STD MICRODIL/AGAR DIL: CPT | Performed by: FAMILY MEDICINE

## 2025-08-03 PROCEDURE — 87088 URINE BACTERIA CULTURE: CPT | Performed by: FAMILY MEDICINE

## 2025-08-03 PROCEDURE — 3078F DIAST BP <80 MM HG: CPT | Performed by: FAMILY MEDICINE

## 2025-08-03 RX ORDER — NITROFURANTOIN 25; 75 MG/1; MG/1
100 CAPSULE ORAL 2 TIMES DAILY
Qty: 14 CAPSULE | Refills: 0 | Status: SHIPPED | OUTPATIENT
Start: 2025-08-03 | End: 2025-08-10

## 2025-08-05 LAB — BACTERIA UR CULT: ABNORMAL
